# Patient Record
Sex: FEMALE | Race: ASIAN | HISPANIC OR LATINO | URBAN - METROPOLITAN AREA
[De-identification: names, ages, dates, MRNs, and addresses within clinical notes are randomized per-mention and may not be internally consistent; named-entity substitution may affect disease eponyms.]

---

## 2018-06-04 ENCOUNTER — OUTPATIENT (OUTPATIENT)
Dept: OUTPATIENT SERVICES | Facility: HOSPITAL | Age: 78
LOS: 1 days | End: 2018-06-04
Payer: MEDICARE

## 2018-06-04 DIAGNOSIS — R06.02 SHORTNESS OF BREATH: ICD-10-CM

## 2018-06-04 PROCEDURE — 78452 HT MUSCLE IMAGE SPECT MULT: CPT

## 2018-06-04 PROCEDURE — 93016 CV STRESS TEST SUPVJ ONLY: CPT

## 2018-06-04 PROCEDURE — 93017 CV STRESS TEST TRACING ONLY: CPT

## 2018-06-04 PROCEDURE — 78452 HT MUSCLE IMAGE SPECT MULT: CPT | Mod: 26

## 2018-06-04 PROCEDURE — A9500: CPT

## 2018-06-04 PROCEDURE — 93018 CV STRESS TEST I&R ONLY: CPT

## 2018-06-04 PROCEDURE — A9505: CPT

## 2018-09-28 ENCOUNTER — OUTPATIENT (OUTPATIENT)
Dept: OUTPATIENT SERVICES | Facility: HOSPITAL | Age: 78
LOS: 1 days | End: 2018-09-28
Payer: MEDICARE

## 2018-09-28 PROCEDURE — 71046 X-RAY EXAM CHEST 2 VIEWS: CPT | Mod: 26

## 2018-09-28 PROCEDURE — 71046 X-RAY EXAM CHEST 2 VIEWS: CPT

## 2018-10-02 ENCOUNTER — APPOINTMENT (OUTPATIENT)
Dept: PULMONOLOGY | Facility: CLINIC | Age: 78
End: 2018-10-02
Payer: MEDICARE

## 2018-10-02 VITALS
HEIGHT: 56 IN | SYSTOLIC BLOOD PRESSURE: 120 MMHG | TEMPERATURE: 98.7 F | BODY MASS INDEX: 25.19 KG/M2 | HEART RATE: 79 BPM | WEIGHT: 112 LBS | RESPIRATION RATE: 12 BRPM | DIASTOLIC BLOOD PRESSURE: 70 MMHG | OXYGEN SATURATION: 97 %

## 2018-10-02 DIAGNOSIS — Z87.01 PERSONAL HISTORY OF PNEUMONIA (RECURRENT): ICD-10-CM

## 2018-10-02 PROCEDURE — 99204 OFFICE O/P NEW MOD 45 MIN: CPT

## 2018-10-09 ENCOUNTER — APPOINTMENT (OUTPATIENT)
Dept: CT IMAGING | Facility: HOSPITAL | Age: 78
End: 2018-10-09
Payer: MEDICARE

## 2018-10-09 ENCOUNTER — OUTPATIENT (OUTPATIENT)
Dept: OUTPATIENT SERVICES | Facility: HOSPITAL | Age: 78
LOS: 1 days | End: 2018-10-09
Payer: MEDICARE

## 2018-10-09 PROCEDURE — 71250 CT THORAX DX C-: CPT

## 2018-10-09 PROCEDURE — 71250 CT THORAX DX C-: CPT | Mod: 26

## 2018-11-15 ENCOUNTER — APPOINTMENT (OUTPATIENT)
Dept: PULMONOLOGY | Facility: CLINIC | Age: 78
End: 2018-11-15
Payer: MEDICARE

## 2018-11-15 VITALS
HEIGHT: 56 IN | TEMPERATURE: 98.8 F | OXYGEN SATURATION: 97 % | BODY MASS INDEX: 24.35 KG/M2 | WEIGHT: 108.25 LBS | SYSTOLIC BLOOD PRESSURE: 110 MMHG | HEART RATE: 82 BPM | DIASTOLIC BLOOD PRESSURE: 70 MMHG

## 2018-11-15 PROCEDURE — 99214 OFFICE O/P EST MOD 30 MIN: CPT

## 2018-11-20 ENCOUNTER — APPOINTMENT (OUTPATIENT)
Dept: SURGERY | Facility: CLINIC | Age: 78
End: 2018-11-20
Payer: MEDICARE

## 2018-11-20 VITALS
TEMPERATURE: 97.9 F | OXYGEN SATURATION: 99 % | BODY MASS INDEX: 24.62 KG/M2 | SYSTOLIC BLOOD PRESSURE: 153 MMHG | DIASTOLIC BLOOD PRESSURE: 75 MMHG | WEIGHT: 109.44 LBS | HEART RATE: 73 BPM | HEIGHT: 56 IN

## 2018-11-20 PROCEDURE — 99203 OFFICE O/P NEW LOW 30 MIN: CPT

## 2018-12-05 ENCOUNTER — OUTPATIENT (OUTPATIENT)
Dept: OUTPATIENT SERVICES | Facility: HOSPITAL | Age: 78
LOS: 1 days | End: 2018-12-05
Payer: MEDICARE

## 2018-12-05 PROCEDURE — 74181 MRI ABDOMEN W/O CONTRAST: CPT | Mod: 26

## 2018-12-05 PROCEDURE — 74181 MRI ABDOMEN W/O CONTRAST: CPT

## 2018-12-07 ENCOUNTER — APPOINTMENT (OUTPATIENT)
Dept: SURGERY | Facility: CLINIC | Age: 78
End: 2018-12-07
Payer: MEDICARE

## 2018-12-07 VITALS
HEART RATE: 79 BPM | HEIGHT: 56 IN | WEIGHT: 111.25 LBS | DIASTOLIC BLOOD PRESSURE: 72 MMHG | TEMPERATURE: 97.6 F | BODY MASS INDEX: 25.02 KG/M2 | OXYGEN SATURATION: 97 % | SYSTOLIC BLOOD PRESSURE: 147 MMHG

## 2018-12-07 PROCEDURE — 99213 OFFICE O/P EST LOW 20 MIN: CPT

## 2018-12-19 ENCOUNTER — APPOINTMENT (OUTPATIENT)
Dept: PULMONOLOGY | Facility: CLINIC | Age: 78
End: 2018-12-19
Payer: MEDICARE

## 2018-12-19 VITALS
TEMPERATURE: 98.6 F | DIASTOLIC BLOOD PRESSURE: 70 MMHG | SYSTOLIC BLOOD PRESSURE: 110 MMHG | WEIGHT: 112.38 LBS | OXYGEN SATURATION: 97 % | BODY MASS INDEX: 25.28 KG/M2 | HEART RATE: 95 BPM | HEIGHT: 56 IN

## 2018-12-19 PROCEDURE — 94727 GAS DIL/WSHOT DETER LNG VOL: CPT

## 2018-12-19 PROCEDURE — 99214 OFFICE O/P EST MOD 30 MIN: CPT | Mod: 25

## 2018-12-19 PROCEDURE — 94729 DIFFUSING CAPACITY: CPT

## 2018-12-19 PROCEDURE — 94060 EVALUATION OF WHEEZING: CPT

## 2019-01-05 ENCOUNTER — APPOINTMENT (OUTPATIENT)
Dept: MRI IMAGING | Facility: HOSPITAL | Age: 79
End: 2019-01-05
Payer: MEDICARE

## 2019-01-05 ENCOUNTER — OUTPATIENT (OUTPATIENT)
Dept: OUTPATIENT SERVICES | Facility: HOSPITAL | Age: 79
LOS: 1 days | End: 2019-01-05
Payer: MEDICARE

## 2019-01-05 PROCEDURE — 74183 MRI ABD W/O CNTR FLWD CNTR: CPT

## 2019-01-05 PROCEDURE — 74183 MRI ABD W/O CNTR FLWD CNTR: CPT | Mod: 26

## 2019-01-08 ENCOUNTER — APPOINTMENT (OUTPATIENT)
Dept: SURGERY | Facility: CLINIC | Age: 79
End: 2019-01-08
Payer: MEDICARE

## 2019-01-08 VITALS
SYSTOLIC BLOOD PRESSURE: 134 MMHG | BODY MASS INDEX: 25.19 KG/M2 | DIASTOLIC BLOOD PRESSURE: 75 MMHG | HEART RATE: 85 BPM | TEMPERATURE: 98 F | OXYGEN SATURATION: 96 % | HEIGHT: 56 IN | WEIGHT: 112 LBS

## 2019-01-08 PROCEDURE — 99213 OFFICE O/P EST LOW 20 MIN: CPT

## 2019-01-08 NOTE — HISTORY OF PRESENT ILLNESS
[de-identified] : Pt is a 77 y/o F with hx of pulmonary nodules, cough who presents today for follow-up of pancreatic cyst. Recent MRI showed multiple pancreatic cysts, largest cyst increased from 2016 report measuring 2.5x 2.0cm of increasing complexity and enhancing mural nodule.

## 2019-01-09 LAB
CANCER AG125 SERPL-ACNC: 11 U/ML
CANCER AG19-9 SERPL-ACNC: 10.6 U/ML
CEA SERPL-MCNC: 2.6 NG/ML

## 2019-01-10 LAB
ALBUMIN SERPL ELPH-MCNC: 4.4 G/DL
ALP BLD-CCNC: 82 U/L
ALT SERPL-CCNC: 13 U/L
ANION GAP SERPL CALC-SCNC: 19 MMOL/L
AST SERPL-CCNC: 19 U/L
BILIRUB SERPL-MCNC: <0.2 MG/DL
BUN SERPL-MCNC: 27 MG/DL
CALCIUM SERPL-MCNC: 9.6 MG/DL
CHLORIDE SERPL-SCNC: 105 MMOL/L
CO2 SERPL-SCNC: 19 MMOL/L
CREAT SERPL-MCNC: 0.79 MG/DL
GLUCOSE SERPL-MCNC: 81 MG/DL
POTASSIUM SERPL-SCNC: 4.5 MMOL/L
PROT SERPL-MCNC: 7.5 G/DL
SODIUM SERPL-SCNC: 143 MMOL/L

## 2019-01-16 ENCOUNTER — OUTPATIENT (OUTPATIENT)
Dept: OUTPATIENT SERVICES | Facility: HOSPITAL | Age: 79
LOS: 1 days | Discharge: HOME | End: 2019-01-16
Payer: MEDICARE

## 2019-01-16 ENCOUNTER — APPOINTMENT (OUTPATIENT)
Dept: GASTROENTEROLOGY | Facility: HOSPITAL | Age: 79
End: 2019-01-16

## 2019-01-16 ENCOUNTER — RESULT REVIEW (OUTPATIENT)
Age: 79
End: 2019-01-16

## 2019-01-16 PROCEDURE — 43242 EGD US FINE NEEDLE BX/ASPIR: CPT

## 2019-01-16 PROCEDURE — 88305 TISSUE EXAM BY PATHOLOGIST: CPT

## 2019-01-16 PROCEDURE — 88173 CYTOPATH EVAL FNA REPORT: CPT

## 2019-01-21 LAB — NON-GYNECOLOGICAL CYTOLOGY STUDY: SIGNIFICANT CHANGE UP

## 2019-02-12 ENCOUNTER — APPOINTMENT (OUTPATIENT)
Dept: SURGERY | Facility: CLINIC | Age: 79
End: 2019-02-12
Payer: MEDICARE

## 2019-02-12 VITALS
DIASTOLIC BLOOD PRESSURE: 77 MMHG | HEIGHT: 56 IN | BODY MASS INDEX: 24.87 KG/M2 | HEART RATE: 71 BPM | SYSTOLIC BLOOD PRESSURE: 129 MMHG | OXYGEN SATURATION: 97 % | WEIGHT: 110.56 LBS

## 2019-02-12 PROCEDURE — 99213 OFFICE O/P EST LOW 20 MIN: CPT

## 2019-02-12 NOTE — PLAN
[FreeTextEntry1] : Pt to be scheduled for distal pancreatectomy with possible splenectomy \par Pt to obtain medical and pulmonary clearance

## 2019-02-12 NOTE — HISTORY OF PRESENT ILLNESS
[de-identified] : Pt is a 79 y/o F with hx of multiple pancreactic cysts, pulmonary nodules, and cough who presents today feeling well. Pt had an MRI in january, to f/u with MRI with  in 2016. The largest cyst has increased in size, complexity, and evidence of enhancing mural nodule. Due to the change in character of this cyst, it is reccomended that the pt undergo distal pancreatectomy with possible splenectomy.

## 2019-02-19 ENCOUNTER — APPOINTMENT (OUTPATIENT)
Dept: GASTROENTEROLOGY | Facility: CLINIC | Age: 79
End: 2019-02-19
Payer: MEDICARE

## 2019-02-19 VITALS
HEART RATE: 83 BPM | WEIGHT: 110 LBS | RESPIRATION RATE: 14 BRPM | SYSTOLIC BLOOD PRESSURE: 121 MMHG | TEMPERATURE: 98.5 F | DIASTOLIC BLOOD PRESSURE: 77 MMHG | BODY MASS INDEX: 24.66 KG/M2 | OXYGEN SATURATION: 96 %

## 2019-02-19 PROCEDURE — 99214 OFFICE O/P EST MOD 30 MIN: CPT

## 2019-02-19 NOTE — PHYSICAL EXAM
[General Appearance - Alert] : alert [General Appearance - In No Acute Distress] : in no acute distress [Sclera] : the sclera and conjunctiva were normal [Outer Ear] : the ears and nose were normal in appearance [Neck Appearance] : the appearance of the neck was normal [Neck Cervical Mass (___cm)] : no neck mass was observed [] : no respiratory distress [Heart Rate And Rhythm] : heart rate was normal and rhythm regular [Abdomen Soft] : soft [Abdomen Tenderness] : non-tender [Abdomen Mass (___ Cm)] : no abdominal mass palpated [Cervical Lymph Nodes Enlarged Anterior Bilaterally] : anterior cervical [No CVA Tenderness] : no ~M costovertebral angle tenderness [Abnormal Walk] : normal gait [Skin Color & Pigmentation] : normal skin color and pigmentation [Oriented To Time, Place, And Person] : oriented to person, place, and time

## 2019-02-19 NOTE — HISTORY OF PRESENT ILLNESS
[Heartburn] : denies heartburn [Nausea] : denies nausea [Vomiting] : denies vomiting [Diarrhea] : denies diarrhea [Constipation] : denies constipation [Yellow Skin Or Eyes (Jaundice)] : denies jaundice [Abdominal Pain] : denies abdominal pain [Abdominal Swelling] : denies abdominal swelling [de-identified] : 78 female with PMH of asthma pancreatic cysts presents for a follow up visit to discuss her recent EUS and further plans for surgery. Patient has pancreatic cysts and underwent EUS/FNA in 1/19 , she was found to have multiple pancreatic cyst with a large complex panc cyst in distal body/tail with enhancing mural nodule, FNA was + for a mucinous lesion. Subsequently patient followed up with surgery and they recommended distal pancreatectomy with splenectomy. Patient states that she is not sure what her diagnosis is and if she has cancer or not and also wants to consider a second surgery opinion before she goes ahead with any surgery.\par Denies any pain abdomen/n/v/change in weight or appetite/diarrhea/constipation.

## 2019-03-08 ENCOUNTER — APPOINTMENT (OUTPATIENT)
Dept: SURGERY | Facility: CLINIC | Age: 79
End: 2019-03-08
Payer: MEDICARE

## 2019-03-08 VITALS
WEIGHT: 107.38 LBS | SYSTOLIC BLOOD PRESSURE: 151 MMHG | HEART RATE: 71 BPM | BODY MASS INDEX: 24.16 KG/M2 | OXYGEN SATURATION: 96 % | HEIGHT: 56 IN | DIASTOLIC BLOOD PRESSURE: 78 MMHG

## 2019-03-08 DIAGNOSIS — R93.89 ABNORMAL FINDINGS ON DIAGNOSTIC IMAGING OF OTHER SPECIFIED BODY STRUCTURES: ICD-10-CM

## 2019-03-08 PROCEDURE — 99213 OFFICE O/P EST LOW 20 MIN: CPT

## 2019-03-14 ENCOUNTER — APPOINTMENT (OUTPATIENT)
Dept: GERIATRICS | Facility: CLINIC | Age: 79
End: 2019-03-14
Payer: MEDICARE

## 2019-03-14 VITALS
DIASTOLIC BLOOD PRESSURE: 80 MMHG | HEIGHT: 56 IN | RESPIRATION RATE: 14 BRPM | OXYGEN SATURATION: 96 % | TEMPERATURE: 97.9 F | HEART RATE: 75 BPM | WEIGHT: 107 LBS | SYSTOLIC BLOOD PRESSURE: 145 MMHG | BODY MASS INDEX: 24.07 KG/M2

## 2019-03-14 DIAGNOSIS — K86.2 CYST OF PANCREAS: ICD-10-CM

## 2019-03-14 PROCEDURE — 99204 OFFICE O/P NEW MOD 45 MIN: CPT | Mod: 25

## 2019-03-14 PROCEDURE — 99497 ADVNCD CARE PLAN 30 MIN: CPT

## 2019-03-15 NOTE — PHYSICAL EXAM
[General Appearance - Alert] : alert [General Appearance - In No Acute Distress] : in no acute distress [General Appearance - Well Nourished] : well nourished [General Appearance - Well Developed] : well developed [General Appearance - Well-Appearing] : healthy appearing [Sclera] : the sclera and conjunctiva were normal [PERRL With Normal Accommodation] : pupils were equal in size, round, and reactive to light [Extraocular Movements] : extraocular movements were intact [Normal Oral Mucosa] : normal oral mucosa [Outer Ear] : the ears and nose were normal in appearance [Hearing Threshold Finger Rub Not Mora] : hearing was normal [Examination Of The Oral Cavity] : the lips and gums were normal [Both Tympanic Membranes Were Examined] : both tympanic membranes were normal [Nasal Cavity] : the nasal mucosa and septum were normal [Oropharynx] : The oropharynx was normal [Neck Appearance] : the appearance of the neck was normal [Neck Cervical Mass (___cm)] : no neck mass was observed [Jugular Venous Distention Increased] : there was no jugular-venous distention [Respiration, Rhythm And Depth] : normal respiratory rhythm and effort [Auscultation Breath Sounds / Voice Sounds] : lungs were clear to auscultation bilaterally [Heart Rate And Rhythm] : heart rate was normal and rhythm regular [Heart Sounds] : normal S1 and S2 [Breast Palpation Mass] : no palpable masses [Bowel Sounds] : normal bowel sounds [Abdomen Soft] : soft [Abdomen Mass (___ Cm)] : no abdominal mass palpated [No CVA Tenderness] : no ~M costovertebral angle tenderness [No Spinal Tenderness] : no spinal tenderness [Abnormal Walk] : normal gait [Nail Clubbing] : no clubbing  or cyanosis of the fingernails [Motor Tone] : muscle strength and tone were normal [Skin Color & Pigmentation] : normal skin color and pigmentation [Skin Turgor] : normal skin turgor [] : no rash [Cranial Nerves] : cranial nerves 2-12 were intact [Sensation] : the sensory exam was normal to light touch and pinprick [Motor Exam] : the motor exam was normal [Oriented To Time, Place, And Person] : oriented to person, place, and time [Impaired Insight] : insight and judgment were intact [Affect] : the affect was normal

## 2019-03-22 NOTE — REVIEW OF SYSTEMS
[Negative] : Heme/Lymph [Anxiety] : no anxiety [Depression] : no depression [FreeTextEntry6] : uses inhaler rarely [FreeTextEntry7] : appetitie good , no significant weight change [de-identified] : sleeps 9 hours daily, participates in activities all day, enjoys watching tv, spend time on computer daily

## 2019-03-22 NOTE — COMPREHENSIVE ASSESSMENT
[Alert] : alert [Oriented To Situation] : ~L oriented to situation [Understands and communicates without difficulty] : Understands and communicates without difficulty [Safety elements used in home] : safety elements used in home [Caregiver Concerns] : has caregiver concerns [Active tobbaco user] : Patient is not a tobacco user [de-identified] : 2 pack per day for 15 years - discontinued 35 years ago [Reports changes in hearing] : Reports no changes in hearing [Reports changes in vision] : Reports no changes in vision [Community support currently involved] : community support not currently involved. [de-identified] : last eval 3/19 [de-identified] : has upper and lower dentures [de-identified] : sister jean offers no assitance [MammogramDate] : 2014 [PapSmearDate] : 2017 [BoneDensityDate] : 2015 [ColonoscopyDate] : 2016

## 2019-03-22 NOTE — REASON FOR VISIT
[Initial Evaluation] : an initial evaluation [FreeTextEntry1] : patient presents for evaluation as she is getting older, she feels has increased needs

## 2019-03-22 NOTE — ASSESSMENT
[Social Work Referral] : Social Work referral [Senior Center: _____] : Senior Center: [unfilled] [Socialization: _____] : socialization: [unfilled] [Medications discussed: _____] : Medications discussed: [unfilled] [Remove loose items] : remove loose rugs or any other loose items that could lead to tripping and falling [Adequate lighting] : provide adequate lighting and use a night light [Medication Review] : reviewed medications that increase the risk of falls [Minimize falling] : use grab bars, anti- slip rugs, and proper shoes to minimize falling [Weight bearing exercise] : recommend weight bearing exercise as tolerated [Dietary calcium intake] : dietary calcium intake discussed [Vitamin D supplementation] : Vitamin D supplementation [Dexa Scan] : dexa scan [Daily physical exercise as tolerated] : Daily physical exercise as tolerated [Discussed at today's visit] : Advance Directives Discussed at today's visit [FreeTextEntry1] : 78 year old female presents for geriatric evaluation- lives alone, feels well, maintains quality of life with friends and family independent of ADL and IADL. Recent progression in size of pancreatic cysts of unclear etiology to be scheduled for procedure to remove cysts and splenectomy.  patient has insight to future needs and would like additional coordiantion of care.\par 1. Primary care- i explained to patient that guidelines regarding yearly routine blood work has changed considerably over the years and that her PMD not drawing blood work should not determine competence.\par I encouraged patient to have confidence in her medical team and if she is seeking new PMD I can assist her in referral or option of [facility] primary care resident clinic where i am one of the supervising physicians.\par 2. Pancreatic cysts- reviewed with patient Dr. Tami Walsh assessment and plan- she is agreeable to proceed.\par We discussed her present home situation, she will contact  in [facility] to see if she can hire someone privately to assist her in activities of daily living upon her discharge. We discussed possibility of sub acute rehab as well if indicated. She acknowledges that her sister who lives with her would not be able to help and her other sisters are not well enough. We discussed the need for updated Health Care proxy- to educated her assigned proxy as to what she would want in the event she may not be able to speak for herself. Discussed MOLST and gave blank copy in [place] for her review to be completed on next MD visit.\par 3. Financial limitations and anticipated future needs- patient will discuss with  medicaid elgibility and [facility].  \par 4. history of fracture- supplement calcium and vitamin d in diet- consider bone denisty\par 5. Health care maintenance and vaccines- obtain records- regarding Pneumovax and Prevnar, shingrix and tetanus.  \par 6. [FreeTextEntry4] : HCP and MOLST given in Nepali and english\par greater 16 minutes spent on advanced care planning

## 2019-03-22 NOTE — SOCIAL HISTORY
[Lives in own residence] : living in ~his/her~ own residence [Fully functional (bathing, dressing, toileting, transferring, walking, feeding)] : Fully functional (bathing, dressing, toileting, transferring, walking, feeding) [Fully functional (using the telephone, shopping, preparing meals, housekeeping, doing laundry, using transportation,] : Fully functional and needs no help or supervision to perform IADLs (using the telephone, shopping, preparing meals, housekeeping, doing laundry, using transportation, managing medications and managing finances) [] :  [No falls in past year] : Patient reported no falls in the past year [FreeTextEntry1] : patient takes advantage of all activities that [facility] provides: bingo, trips and lunch daily- patient residence is next to [facility]. [de-identified] : Patient relays two falls - one with shopping cart- mechanical fall some years ago  and two years ago awakend by alarm clock fell sustained clavicular fracture

## 2019-03-22 NOTE — HISTORY OF PRESENT ILLNESS
[FreeTextEntry1] : patient is a good historian. She states she has had good medical care over the years. Well controlled asthma -rarely uses inhaler- distant tobacco use Followed by Dr. Maurice at [address] [place] [address] for over ten years until her retired and then by Dr. Guillen. Prior to that her care was in [place] where she lived.\par she presents today for geriatric evaluation as well as finding a new PMD.\par she states she does not feel confident with present care for one reason that he does not do comprehensive blood work yearly as she is accustomed to. \par She states overall she is in excellent health, she has no limitations or fears. \par She had to be reminded of recent concern of lung nodules and pancreatic cysts. \par She states these were found incidentally and overall she feels great.\par She is able to relay that she has gone for two opinions regarding the progressive enlargement of pancreatic cysts and concern of the underlying etiology of growth. She understands the best approach is to remove it and waiting for surgical date.\par I asked her if there were any concerns that initiated Dr. Mercado evaluation and findings of abnormal chest x ray- she denies.\par I have no collateral medical records to confirm above.\par

## 2019-03-26 ENCOUNTER — NON-APPOINTMENT (OUTPATIENT)
Age: 79
End: 2019-03-26

## 2019-03-26 ENCOUNTER — APPOINTMENT (OUTPATIENT)
Dept: PULMONOLOGY | Facility: CLINIC | Age: 79
End: 2019-03-26
Payer: MEDICARE

## 2019-03-26 VITALS
TEMPERATURE: 98.7 F | HEIGHT: 56 IN | OXYGEN SATURATION: 94 % | WEIGHT: 105 LBS | BODY MASS INDEX: 23.62 KG/M2 | DIASTOLIC BLOOD PRESSURE: 80 MMHG | SYSTOLIC BLOOD PRESSURE: 130 MMHG | HEART RATE: 75 BPM

## 2019-03-26 PROCEDURE — 99214 OFFICE O/P EST MOD 30 MIN: CPT

## 2019-03-26 NOTE — PHYSICAL EXAM
[General Appearance - Well Developed] : well developed [Normal Appearance] : normal appearance [Well Groomed] : well groomed [General Appearance - Well Nourished] : well nourished [No Deformities] : no deformities [General Appearance - In No Acute Distress] : no acute distress [Normal Conjunctiva] : the conjunctiva exhibited no abnormalities [Eyelids - No Xanthelasma] : the eyelids demonstrated no xanthelasmas [Normal Oropharynx] : normal oropharynx [Neck Appearance] : the appearance of the neck was normal [Neck Cervical Mass (___cm)] : no neck mass was observed [Jugular Venous Distention Increased] : there was no jugular-venous distention [Thyroid Diffuse Enlargement] : the thyroid was not enlarged [Thyroid Nodule] : there were no palpable thyroid nodules [Heart Rate And Rhythm] : heart rate and rhythm were normal [Heart Sounds] : normal S1 and S2 [Murmurs] : no murmurs present [Respiration, Rhythm And Depth] : normal respiratory rhythm and effort [Exaggerated Use Of Accessory Muscles For Inspiration] : no accessory muscle use [Auscultation Breath Sounds / Voice Sounds] : lungs were clear to auscultation bilaterally [Abdomen Soft] : soft [Abdomen Tenderness] : non-tender [Abdomen Mass (___ Cm)] : no abdominal mass palpated [Abnormal Walk] : normal gait [Gait - Sufficient For Exercise Testing] : the gait was sufficient for exercise testing [Nail Clubbing] : no clubbing of the fingernails [Cyanosis, Localized] : no localized cyanosis [Petechial Hemorrhages (___cm)] : no petechial hemorrhages [Deep Tendon Reflexes (DTR)] : deep tendon reflexes were 2+ and symmetric [Sensation] : the sensory exam was normal to light touch and pinprick [No Focal Deficits] : no focal deficits [Skin Color & Pigmentation] : normal skin color and pigmentation [Skin Turgor] : normal skin turgor [] : no rash

## 2019-03-26 NOTE — HISTORY OF PRESENT ILLNESS
[Stable] : are stable [Difficulty Breathing During Exertion] : denies dyspnea on exertion [Feelings Of Weakness On Exertion] : denies exercise intolerance [Cough] : improved coughing [Wheezing] : denies wheezing [Regional Soft Tissue Swelling Both Lower Extremities] : denies lower extremity edema [Chest Pain Or Discomfort] : denies chest pain [Fever] : denies fever [0  -  Nothing at all] : 0, nothing at all [Class I - No Symptoms and No Limitations] : I [Date: ___] : was performed [unfilled] [___ Times a Week] : [unfilled] time(s) a week [___ Year Quit] : ~He/She~ quit smoking in [unfilled] [___ Pack Year History] : [unfilled] pack year history [None] : The patient is not currently on any medications [Wt Gain ___ Lbs] : no recent weight gain [Wt Loss ___ Lbs] : no recent weight loss [Oxygen] : the patient uses no supplemental oxygen [More Frequent Use Needed Recently] : Patient reports no recent increase in frequency of [de-identified] : multiple pulmonary nodules.   [FreeTextEntry1] : Pt is a 78 year old female with PMH of former smoker (quit 1980s with 15 pack years) pulmonary nodules, Asthma and PNA.  Pt presents today for follow up.\par \par Pt was originally referred to us for an abnormal CXR from her PCP Dr. Mercado.  Follow up CT scan was performed October 9, 2018, with findings of multiple solid subcentimeter nodules largest 5 mm and signs of small and large airways disease with mild air trapping and tree and bud nodules.  Incidental finding was 2 cystic structures, measuring 2 cm, in the pancreatic bed.  \par \par Patients Mammograms (2016) and colonoscopy (2014) have been normal and up to date. She is uptodate with flu vaccine received 2 weeks ago.\par \par Pt was seen by Dr. Jamison was informed she needs surg. However pt wanted second opinion and went to Dr. Walsh.  She is planning to forward with Surg with Dr. Walsh for Robotic assisted distal pancreatectomy and splenectomy. \par \par She has complaints of cough that started 10 days.  She was seen by Dr. Mercado was given Azithromycin for 5 days finished the course with some relief and benzonatate.   However cough persists.  Denies fever, productive cough or increased SOB.  She has complaints of wheezing for 4 days.  \par \par Pt continues on Breo.  She is rarely using her Ventolin, has needed it 2 x since been sick.  She walks over 5 blocks daily without any SOB and denies SOB with ADLS. Denies any ER or  hospital visit due to asthma. \par \par \par

## 2019-03-26 NOTE — PROCEDURE
[FreeTextEntry1] : PFT. No signs of OLD or RLD without any significant response to bronchodilator.  TLC and DLCO WNL.

## 2019-03-26 NOTE — ASSESSMENT
[FreeTextEntry1] : Pt is a 78 year old female with PMH of former smoker (quit 1980s with 15 pack years) pulmonary nodules, Asthma and PNA.\par \par Pulmonary nodules\par \par Pt was originally referred to us for an abnormal CXR from her PCP Dr. Mercado.  Follow up CT scan was performed October 9, 2018, with findings of multiple solid subcentimeter nodules largest 5 mm and signs of small and large airways disease with mild air trapping and tree and bud nodules.  Incidental finding was 2 cystic structures, measuring 2 cm, in the pancreatic bed.  \par \par Discussed pulmonary nodules could be due to scarring, inflammation or malignancy, but not limited too.  Reviewed Fleischer society guidelines with pt and high risk due to smoking history. \par \par Plan \par - Follow up with repeat CT scan in one year\par \par Asthma\par \par Pt is currently unstable rarely her LEILA 2 times since sick.    She finished a course of antibiotics Zpack. On exam pt with right upper wheezing.   She is currently only using Breo and has not required her Ventolin since last visit.  \par \par Plan \par - Allamuchy today without significant change from PFT \par - Increased the pt Breo to high dose given sample and continue on Ventolin as needed.  She was given education on worsening asthma\par - Medrol dose pack provided \par - Follow up in one to two weeks for clearance for surg. \par

## 2019-04-09 ENCOUNTER — FORM ENCOUNTER (OUTPATIENT)
Age: 79
End: 2019-04-09

## 2019-04-10 ENCOUNTER — OUTPATIENT (OUTPATIENT)
Dept: OUTPATIENT SERVICES | Facility: HOSPITAL | Age: 79
LOS: 1 days | End: 2019-04-10
Payer: COMMERCIAL

## 2019-04-10 ENCOUNTER — APPOINTMENT (OUTPATIENT)
Dept: PULMONOLOGY | Facility: CLINIC | Age: 79
End: 2019-04-10
Payer: MEDICARE

## 2019-04-10 VITALS
TEMPERATURE: 98.9 F | HEIGHT: 56 IN | HEART RATE: 78 BPM | WEIGHT: 105 LBS | DIASTOLIC BLOOD PRESSURE: 70 MMHG | BODY MASS INDEX: 23.62 KG/M2 | OXYGEN SATURATION: 96 % | SYSTOLIC BLOOD PRESSURE: 120 MMHG

## 2019-04-10 PROCEDURE — 71046 X-RAY EXAM CHEST 2 VIEWS: CPT

## 2019-04-10 PROCEDURE — 71046 X-RAY EXAM CHEST 2 VIEWS: CPT | Mod: 26

## 2019-04-10 PROCEDURE — 99214 OFFICE O/P EST MOD 30 MIN: CPT

## 2019-04-10 NOTE — HISTORY OF PRESENT ILLNESS
[Stable] : are stable [Regional Soft Tissue Swelling Both Lower Extremities] : denies lower extremity edema [Wheezing] : denies wheezing [Chest Pain Or Discomfort] : denies chest pain [Fever] : denies fever [0  -  Nothing at all] : 0, nothing at all [Date: ___] : was performed [unfilled] [Class I - No Symptoms and No Limitations] : I [___ Times a Week] : [unfilled] time(s) a week [___ Year Quit] : ~He/She~ quit smoking in [unfilled] [___ Pack Year History] : [unfilled] pack year history [None] : The patient is not currently on any medications [Difficulty Breathing During Exertion] : stable dyspnea on exertion [Cough] : denies coughing [Feelings Of Weakness On Exertion] : stable exercise intolerance [Wt Gain ___ Lbs] : no recent weight gain [Oxygen] : the patient uses no supplemental oxygen [Wt Loss ___ Lbs] : no recent weight loss [de-identified] : multiple pulmonary nodules.   [More Frequent Use Needed Recently] : Patient reports no recent increase in frequency of [FreeTextEntry1] : Pt is a 78 year old female with PMH of former smoker (quit 1980s with 15 pack years) pulmonary nodules, Asthma and PNA.  Pt presents today for follow up.\par \par Pt was originally referred to us for an abnormal CXR from her PCP Dr. Mercado.  Follow up CT scan was performed October 9, 2018, with findings of multiple solid subcentimeter nodules largest 5 mm and signs of small and large airways disease with mild air trapping and tree and bud nodules.  Incidental finding was 2 cystic structures, measuring 2 cm, in the pancreatic bed.  \par \par Patients Mammograms (2016) and colonoscopy (2014) have been normal and up to date. She is uptodate with flu vaccine received 2 weeks ago.\par \par Pt was seen by Dr. Jamison was informed she needs surg. However pt wanted second opinion and went to Dr. Walsh.  She is scheduled for surg with Jasmyn Walsh for Robotic assisted distal pancreatectomy and splenectomy at the end of the month. \par \par Her cough has improved since last visit.  Denies fever, cough, SOB or wheezing. Denies any nocturnal awakening.  ACT score is 25/25.  She finished the sample of the breo 200 mcg yesterday and has not required the Ventolin.  \par \par Pt here for pulmonary clearance for surg with Dr. Walsh.  Fax to surgical coordinator [phone]\par She walks over 5 blocks daily without any SOB and denies SOB with ADLS. Denies any ER or  hospital visit due to asthma. \par \par \par \par \par

## 2019-04-10 NOTE — PHYSICAL EXAM
[General Appearance - Well Developed] : well developed [Well Groomed] : well groomed [Normal Appearance] : normal appearance [General Appearance - Well Nourished] : well nourished [No Deformities] : no deformities [General Appearance - In No Acute Distress] : no acute distress [Normal Conjunctiva] : the conjunctiva exhibited no abnormalities [Eyelids - No Xanthelasma] : the eyelids demonstrated no xanthelasmas [Normal Oropharynx] : normal oropharynx [Neck Appearance] : the appearance of the neck was normal [Neck Cervical Mass (___cm)] : no neck mass was observed [Thyroid Nodule] : there were no palpable thyroid nodules [Jugular Venous Distention Increased] : there was no jugular-venous distention [Thyroid Diffuse Enlargement] : the thyroid was not enlarged [Heart Rate And Rhythm] : heart rate and rhythm were normal [Heart Sounds] : normal S1 and S2 [Murmurs] : no murmurs present [Exaggerated Use Of Accessory Muscles For Inspiration] : no accessory muscle use [Respiration, Rhythm And Depth] : normal respiratory rhythm and effort [Auscultation Breath Sounds / Voice Sounds] : lungs were clear to auscultation bilaterally [Abdomen Soft] : soft [Abdomen Mass (___ Cm)] : no abdominal mass palpated [Abdomen Tenderness] : non-tender [Abnormal Walk] : normal gait [Gait - Sufficient For Exercise Testing] : the gait was sufficient for exercise testing [Cyanosis, Localized] : no localized cyanosis [Nail Clubbing] : no clubbing of the fingernails [Deep Tendon Reflexes (DTR)] : deep tendon reflexes were 2+ and symmetric [Petechial Hemorrhages (___cm)] : no petechial hemorrhages [Sensation] : the sensory exam was normal to light touch and pinprick [Skin Color & Pigmentation] : normal skin color and pigmentation [No Focal Deficits] : no focal deficits [Skin Turgor] : normal skin turgor [] : no rash

## 2019-04-10 NOTE — ASSESSMENT
[FreeTextEntry1] : Pt is a 78 year old female with PMH of former smoker (quit 1980s with 15 pack years) pulmonary nodules, Asthma and PNA.\par \par Pulmonary nodules\par \par Pt was originally referred to us for an abnormal CXR from her PCP Dr. Mercado.  Follow up CT scan was performed October 9, 2018, with findings of multiple solid subcentimeter nodules largest 5 mm and signs of small and large airways disease with mild air trapping and tree and bud nodules.  Incidental finding was 2 cystic structures, measuring 2 cm, in the pancreatic bed.  \par \par Discussed pulmonary nodules could be due to scarring, inflammation or malignancy, but not limited too.  Reviewed Fleischer society guidelines with pt and high risk due to smoking history. \par \par Plan \par - Follow up with repeat CT scan in one year (10/2019)\par \par Asthma\par \par Pt is currently stable and rarely requiring her LEILA. She is to decrease her Breo back to 100 mcg.  ACT score 25/25. Her cough has improved since last visit. Slaughter last visit without significant change from prior PFT. \par \par Pulmonary preop\par \par RUBINA CARVAJAL  is optimized for surgery. she  is to be extubated once fully awake and able to protect airway.  The patient is to be monitored in the recovery room. They might benefit for high flow oxygen or noninvasive ventilation to prevent or reverse atelectasis.  Patient is to be admitted to a monitored bed postoperatively.  Avoid oversedation.  RUBINA is high risk for DVT and will require bimodal agents for DVT prophylaxis early mobilization is recommended. she is to use the incentive spirometry postoperative. \par \par - Follow CXR\par \par \par

## 2019-04-11 ENCOUNTER — APPOINTMENT (OUTPATIENT)
Dept: INTERNAL MEDICINE | Facility: CLINIC | Age: 79
End: 2019-04-11

## 2019-04-19 ENCOUNTER — OTHER (OUTPATIENT)
Age: 79
End: 2019-04-19

## 2019-04-30 ENCOUNTER — APPOINTMENT (OUTPATIENT)
Dept: PULMONOLOGY | Facility: CLINIC | Age: 79
End: 2019-04-30
Payer: MEDICARE

## 2019-04-30 ENCOUNTER — NON-APPOINTMENT (OUTPATIENT)
Age: 79
End: 2019-04-30

## 2019-04-30 VITALS
HEART RATE: 90 BPM | WEIGHT: 107 LBS | DIASTOLIC BLOOD PRESSURE: 80 MMHG | HEIGHT: 56 IN | OXYGEN SATURATION: 97 % | SYSTOLIC BLOOD PRESSURE: 130 MMHG | TEMPERATURE: 98.2 F | BODY MASS INDEX: 24.07 KG/M2

## 2019-04-30 VITALS
OXYGEN SATURATION: 97 % | HEIGHT: 55 IN | SYSTOLIC BLOOD PRESSURE: 137 MMHG | DIASTOLIC BLOOD PRESSURE: 63 MMHG | HEART RATE: 72 BPM | TEMPERATURE: 98 F | WEIGHT: 151.46 LBS | RESPIRATION RATE: 18 BRPM

## 2019-04-30 DIAGNOSIS — Z01.811 ENCOUNTER FOR PREPROCEDURAL RESPIRATORY EXAMINATION: ICD-10-CM

## 2019-04-30 PROCEDURE — 36415 COLL VENOUS BLD VENIPUNCTURE: CPT

## 2019-04-30 PROCEDURE — 93000 ELECTROCARDIOGRAM COMPLETE: CPT

## 2019-04-30 PROCEDURE — 99214 OFFICE O/P EST MOD 30 MIN: CPT | Mod: 25

## 2019-04-30 NOTE — ASSESSMENT
[FreeTextEntry1] : Pulmonary nodules\par \par Pt was originally referred to us for an abnormal CXR from her PCP Dr. Mercado. Follow up CT scan was performed October 9, 2018, with findings of multiple solid subcentimeter nodules largest 5 mm and signs of small and large airways disease with mild air trapping and tree and bud nodules. Incidental finding was 2 cystic structures, measuring 2 cm, in the pancreatic bed. \par \par Discussed pulmonary nodules could be due to scarring, inflammation or malignancy, but not limited too. Reviewed [employer] society guidelines with pt and high risk due to smoking history. \par \par Plan \par - Follow up with repeat CT scan in one year (10/2019)\par \par Asthma\par \par Pt is currently stable and rarely requiring her LEILA. Continue on Breo 200 until after surgery.  ACT score 25/25. Her cough has improved since last visit. Juan last visit without significant change from prior PFT. \par \par Pulmonary preop\par \par RUBINA CARVAJAL is optimized for surgery. she is to be extubated once fully awake and able to protect airway. The patient is to be monitored in the recovery room. They might benefit for high flow oxygen or noninvasive ventilation to prevent or reverse atelectasis. Patient is to be admitted to a monitored bed postoperatively. Avoid oversedation. RUBINA is high risk for DVT and will require bimodal agents for DVT prophylaxis early mobilization is recommended. she is to use the incentive spirometry postoperative. \par \par - follow on labs\par EKG RSR normal\par I discussed with Dr Stone and cleared cardiac abarca

## 2019-04-30 NOTE — HISTORY OF PRESENT ILLNESS
[Difficulty Breathing During Exertion] : denies dyspnea on exertion [Feelings Of Weakness On Exertion] : denies exercise intolerance [Cough] : denies coughing [Wheezing] : denies wheezing [Regional Soft Tissue Swelling Both Lower Extremities] : denies lower extremity edema [Chest Pain Or Discomfort] : denies chest pain [Fever] : denies fever [0  -  Nothing at all] : 0, nothing at all [Class II - Mild Symptoms and Slight Limitations] : II [More Frequent Use Needed Recently] : Patient reports recent increase in frequency of [___ Times a Day] : [unfilled] time(s) a day [Never] : was never a smoker [None] : None [Adherent] : the patient is adherent with ~his/her~ medication regimen [Goals--Doing Well] : the patient is doing well with ~his/her~ goals [Wt Gain ___ Lbs] : no recent weight gain [Wt Loss ___ Lbs] : no recent weight loss [Oxygen] : the patient uses no supplemental oxygen [Good Control] : peak flow has been poor [Side Effects] : ~He/She~ denies medication side effects [FreeTextEntry1] : She is doing very well. Her run off Breo the last 4 days. She received Ventolin as needed basis. No severe shortness 14 blocks with no problem. She does not wake up in the middle night gasping for air. The leg is not swollen.

## 2019-05-01 ENCOUNTER — RESULT REVIEW (OUTPATIENT)
Age: 79
End: 2019-05-01

## 2019-05-01 ENCOUNTER — INPATIENT (INPATIENT)
Facility: HOSPITAL | Age: 79
LOS: 4 days | Discharge: HOME | DRG: 407 | End: 2019-05-06
Attending: SURGERY | Admitting: SURGERY
Payer: MEDICARE

## 2019-05-01 DIAGNOSIS — Z98.890 OTHER SPECIFIED POSTPROCEDURAL STATES: Chronic | ICD-10-CM

## 2019-05-01 LAB
ALBUMIN SERPL ELPH-MCNC: 4.1 G/DL
ALP BLD-CCNC: 92 U/L
ALT SERPL-CCNC: 14 U/L
ANION GAP SERPL CALC-SCNC: 11 MMOL/L
ANION GAP SERPL CALC-SCNC: 12 MMOL/L — SIGNIFICANT CHANGE UP (ref 5–17)
APTT BLD: 28.3 SEC — SIGNIFICANT CHANGE UP (ref 27.5–36.3)
APTT BLD: 36.6 SEC
AST SERPL-CCNC: 19 U/L
BASOPHILS # BLD AUTO: 0.03 K/UL
BASOPHILS # BLD AUTO: 0.06 K/UL — SIGNIFICANT CHANGE UP (ref 0–0.2)
BASOPHILS NFR BLD AUTO: 0.3 %
BASOPHILS NFR BLD AUTO: 0.3 % — SIGNIFICANT CHANGE UP (ref 0–2)
BILIRUB SERPL-MCNC: 0.3 MG/DL
BUN SERPL-MCNC: 11 MG/DL — SIGNIFICANT CHANGE UP (ref 7–23)
BUN SERPL-MCNC: 19 MG/DL
CALCIUM SERPL-MCNC: 8.7 MG/DL — SIGNIFICANT CHANGE UP (ref 8.4–10.5)
CALCIUM SERPL-MCNC: 9.3 MG/DL
CHLORIDE SERPL-SCNC: 102 MMOL/L — SIGNIFICANT CHANGE UP (ref 96–108)
CHLORIDE SERPL-SCNC: 105 MMOL/L
CO2 SERPL-SCNC: 24 MMOL/L — SIGNIFICANT CHANGE UP (ref 22–31)
CO2 SERPL-SCNC: 26 MMOL/L
CREAT SERPL-MCNC: 0.62 MG/DL — SIGNIFICANT CHANGE UP (ref 0.5–1.3)
CREAT SERPL-MCNC: 0.71 MG/DL
EOSINOPHIL # BLD AUTO: 0.03 K/UL — SIGNIFICANT CHANGE UP (ref 0–0.5)
EOSINOPHIL # BLD AUTO: 0.11 K/UL
EOSINOPHIL NFR BLD AUTO: 0.2 % — SIGNIFICANT CHANGE UP (ref 0–6)
EOSINOPHIL NFR BLD AUTO: 1.3 %
GLUCOSE SERPL-MCNC: 102 MG/DL
GLUCOSE SERPL-MCNC: 161 MG/DL — HIGH (ref 70–99)
HCT VFR BLD CALC: 39.1 % — SIGNIFICANT CHANGE UP (ref 34.5–45)
HCT VFR BLD CALC: 42.7 %
HGB BLD-MCNC: 12.6 G/DL — SIGNIFICANT CHANGE UP (ref 11.5–15.5)
HGB BLD-MCNC: 13.6 G/DL
IMM GRANULOCYTES NFR BLD AUTO: 0.2 %
IMM GRANULOCYTES NFR BLD AUTO: 0.6 % — SIGNIFICANT CHANGE UP (ref 0–1.5)
INR BLD: 1.09 — SIGNIFICANT CHANGE UP (ref 0.88–1.16)
INR PPP: 1.03
LACTATE SERPL-SCNC: 2.1 MMOL/L — HIGH (ref 0.5–2)
LDH SERPL L TO P-CCNC: 282 U/L — HIGH (ref 50–242)
LYMPHOCYTES # BLD AUTO: 10.2 % — LOW (ref 13–44)
LYMPHOCYTES # BLD AUTO: 2.04 K/UL — SIGNIFICANT CHANGE UP (ref 1–3.3)
LYMPHOCYTES # BLD AUTO: 2.72 K/UL
LYMPHOCYTES NFR BLD AUTO: 31.4 %
MAGNESIUM SERPL-MCNC: 2 MG/DL — SIGNIFICANT CHANGE UP (ref 1.6–2.6)
MAN DIFF?: NORMAL
MCHC RBC-ENTMCNC: 29.2 PG
MCHC RBC-ENTMCNC: 29.7 PG — SIGNIFICANT CHANGE UP (ref 27–34)
MCHC RBC-ENTMCNC: 31.9 GM/DL
MCHC RBC-ENTMCNC: 32.2 GM/DL — SIGNIFICANT CHANGE UP (ref 32–36)
MCV RBC AUTO: 91.6 FL
MCV RBC AUTO: 92.2 FL — SIGNIFICANT CHANGE UP (ref 80–100)
MONOCYTES # BLD AUTO: 0.67 K/UL — SIGNIFICANT CHANGE UP (ref 0–0.9)
MONOCYTES # BLD AUTO: 0.68 K/UL
MONOCYTES NFR BLD AUTO: 3.4 % — SIGNIFICANT CHANGE UP (ref 2–14)
MONOCYTES NFR BLD AUTO: 7.8 %
NEUTROPHILS # BLD AUTO: 17.08 K/UL — HIGH (ref 1.8–7.4)
NEUTROPHILS # BLD AUTO: 5.11 K/UL
NEUTROPHILS NFR BLD AUTO: 59 %
NEUTROPHILS NFR BLD AUTO: 85.3 % — HIGH (ref 43–77)
NRBC # BLD: 0 /100 WBCS — SIGNIFICANT CHANGE UP (ref 0–0)
PHOSPHATE SERPL-MCNC: 3.3 MG/DL — SIGNIFICANT CHANGE UP (ref 2.5–4.5)
PLATELET # BLD AUTO: 253 K/UL — SIGNIFICANT CHANGE UP (ref 150–400)
PLATELET # BLD AUTO: 279 K/UL
POTASSIUM SERPL-MCNC: 4.4 MMOL/L — SIGNIFICANT CHANGE UP (ref 3.5–5.3)
POTASSIUM SERPL-SCNC: 4.4 MMOL/L
POTASSIUM SERPL-SCNC: 4.4 MMOL/L — SIGNIFICANT CHANGE UP (ref 3.5–5.3)
PROT SERPL-MCNC: 7.3 G/DL
PROTHROM AB SERPL-ACNC: 12.4 SEC — SIGNIFICANT CHANGE UP (ref 10–12.9)
PT BLD: 11.7 SEC
RBC # BLD: 4.24 M/UL — SIGNIFICANT CHANGE UP (ref 3.8–5.2)
RBC # BLD: 4.66 M/UL
RBC # FLD: 12.7 % — SIGNIFICANT CHANGE UP (ref 10.3–14.5)
RBC # FLD: 12.9 %
SODIUM SERPL-SCNC: 138 MMOL/L — SIGNIFICANT CHANGE UP (ref 135–145)
SODIUM SERPL-SCNC: 142 MMOL/L
WBC # BLD: 20 K/UL — HIGH (ref 3.8–10.5)
WBC # FLD AUTO: 20 K/UL — HIGH (ref 3.8–10.5)
WBC # FLD AUTO: 8.67 K/UL

## 2019-05-01 PROCEDURE — 99232 SBSQ HOSP IP/OBS MODERATE 35: CPT | Mod: GC

## 2019-05-01 PROCEDURE — 38120 LAPAROSCOPY SPLENECTOMY: CPT | Mod: 82,59

## 2019-05-01 PROCEDURE — S2900 ROBOTIC SURGICAL SYSTEM: CPT | Mod: NC

## 2019-05-01 PROCEDURE — 48140 PARTIAL REMOVAL OF PANCREAS: CPT | Mod: 82

## 2019-05-01 RX ORDER — ASPIRIN/CALCIUM CARB/MAGNESIUM 324 MG
1 TABLET ORAL
Qty: 0 | Refills: 0 | COMMUNITY

## 2019-05-01 RX ORDER — ALBUTEROL 90 UG/1
2 AEROSOL, METERED ORAL EVERY 6 HOURS
Qty: 0 | Refills: 0 | Status: DISCONTINUED | OUTPATIENT
Start: 2019-05-01 | End: 2019-05-06

## 2019-05-01 RX ORDER — ALBUTEROL 90 UG/1
2 AEROSOL, METERED ORAL
Qty: 0 | Refills: 0 | COMMUNITY

## 2019-05-01 RX ORDER — HEPARIN SODIUM 5000 [USP'U]/ML
5000 INJECTION INTRAVENOUS; SUBCUTANEOUS EVERY 8 HOURS
Qty: 0 | Refills: 0 | Status: DISCONTINUED | OUTPATIENT
Start: 2019-05-01 | End: 2019-05-06

## 2019-05-01 RX ORDER — BUPIVACAINE 13.3 MG/ML
20 INJECTION, SUSPENSION, LIPOSOMAL INFILTRATION ONCE
Qty: 0 | Refills: 0 | Status: DISCONTINUED | OUTPATIENT
Start: 2019-05-01 | End: 2019-05-02

## 2019-05-01 RX ORDER — HYDROMORPHONE HYDROCHLORIDE 2 MG/ML
0.3 INJECTION INTRAMUSCULAR; INTRAVENOUS; SUBCUTANEOUS
Qty: 0 | Refills: 0 | Status: DISCONTINUED | OUTPATIENT
Start: 2019-05-01 | End: 2019-05-01

## 2019-05-01 RX ORDER — SODIUM CHLORIDE 9 MG/ML
1000 INJECTION, SOLUTION INTRAVENOUS
Qty: 0 | Refills: 0 | Status: DISCONTINUED | OUTPATIENT
Start: 2019-05-01 | End: 2019-05-03

## 2019-05-01 RX ORDER — HYDROMORPHONE HYDROCHLORIDE 2 MG/ML
0.5 INJECTION INTRAMUSCULAR; INTRAVENOUS; SUBCUTANEOUS EVERY 6 HOURS
Qty: 0 | Refills: 0 | Status: DISCONTINUED | OUTPATIENT
Start: 2019-05-01 | End: 2019-05-02

## 2019-05-01 RX ORDER — HYDROMORPHONE HYDROCHLORIDE 2 MG/ML
1 INJECTION INTRAMUSCULAR; INTRAVENOUS; SUBCUTANEOUS EVERY 6 HOURS
Qty: 0 | Refills: 0 | Status: DISCONTINUED | OUTPATIENT
Start: 2019-05-01 | End: 2019-05-02

## 2019-05-01 RX ORDER — HEPARIN SODIUM 5000 [USP'U]/ML
5000 INJECTION INTRAVENOUS; SUBCUTANEOUS EVERY 12 HOURS
Qty: 0 | Refills: 0 | Status: DISCONTINUED | OUTPATIENT
Start: 2019-05-01 | End: 2019-05-01

## 2019-05-01 RX ORDER — ONDANSETRON 8 MG/1
4 TABLET, FILM COATED ORAL EVERY 4 HOURS
Qty: 0 | Refills: 0 | Status: DISCONTINUED | OUTPATIENT
Start: 2019-05-01 | End: 2019-05-06

## 2019-05-01 RX ADMIN — HYDROMORPHONE HYDROCHLORIDE 0.3 MILLIGRAM(S): 2 INJECTION INTRAMUSCULAR; INTRAVENOUS; SUBCUTANEOUS at 13:24

## 2019-05-01 RX ADMIN — HYDROMORPHONE HYDROCHLORIDE 0.5 MILLIGRAM(S): 2 INJECTION INTRAMUSCULAR; INTRAVENOUS; SUBCUTANEOUS at 22:50

## 2019-05-01 RX ADMIN — HYDROMORPHONE HYDROCHLORIDE 0.5 MILLIGRAM(S): 2 INJECTION INTRAMUSCULAR; INTRAVENOUS; SUBCUTANEOUS at 16:18

## 2019-05-01 RX ADMIN — HEPARIN SODIUM 5000 UNIT(S): 5000 INJECTION INTRAVENOUS; SUBCUTANEOUS at 21:29

## 2019-05-01 RX ADMIN — HYDROMORPHONE HYDROCHLORIDE 0.3 MILLIGRAM(S): 2 INJECTION INTRAMUSCULAR; INTRAVENOUS; SUBCUTANEOUS at 14:22

## 2019-05-01 RX ADMIN — HYDROMORPHONE HYDROCHLORIDE 0.3 MILLIGRAM(S): 2 INJECTION INTRAMUSCULAR; INTRAVENOUS; SUBCUTANEOUS at 12:30

## 2019-05-01 RX ADMIN — HYDROMORPHONE HYDROCHLORIDE 0.3 MILLIGRAM(S): 2 INJECTION INTRAMUSCULAR; INTRAVENOUS; SUBCUTANEOUS at 11:54

## 2019-05-01 RX ADMIN — HYDROMORPHONE HYDROCHLORIDE 0.5 MILLIGRAM(S): 2 INJECTION INTRAMUSCULAR; INTRAVENOUS; SUBCUTANEOUS at 22:29

## 2019-05-01 RX ADMIN — HEPARIN SODIUM 5000 UNIT(S): 5000 INJECTION INTRAVENOUS; SUBCUTANEOUS at 14:24

## 2019-05-01 RX ADMIN — HYDROMORPHONE HYDROCHLORIDE 0.5 MILLIGRAM(S): 2 INJECTION INTRAMUSCULAR; INTRAVENOUS; SUBCUTANEOUS at 16:30

## 2019-05-01 NOTE — H&P ADULT - NSHPPHYSICALEXAM_GEN_ALL_CORE
Vital Signs Last 24 Hrs  T(C): 36.3 (01 May 2019 11:37), Max: 36.7 (30 Apr 2019 13:44)  T(F): 97.3 (01 May 2019 11:37), Max: 98.1 (30 Apr 2019 13:44)  HR: 72 (01 May 2019 12:29) (70 - 78)  BP: 127/59 (01 May 2019 12:29) (121/60 - 141/67)  BP(mean): 84 (01 May 2019 12:29) (81 - 97)  RR: 21 (01 May 2019 12:29) (18 - 28)  SpO2: 98% (01 May 2019 12:29) (97% - 100%)    Physical Exam:   Gen: AOx3, pleasant in NAD  Cor: RRR, +S1S2, no MRG  Pulm: CTA b/l No W/R/S  Abd: +BS, Soft, non distended, TTP in RLQ with localized guarding, +Rovsings  Ext: WWP, non edematous, DP +2 b/l

## 2019-05-01 NOTE — BRIEF OPERATIVE NOTE - OPERATION/FINDINGS
5 cm pancreatic cyst identified robotically and under ultrasound.  Body of pancreas and splenic artery and vein transected with 60 mm purple stapler x 2.

## 2019-05-01 NOTE — H&P ADULT - NSHPLABSRESULTS_GEN_ALL_CORE
Vital Signs Last 24 Hrs  T(C): 36.3 (01 May 2019 11:37), Max: 36.7 (30 Apr 2019 13:44)  T(F): 97.3 (01 May 2019 11:37), Max: 98.1 (30 Apr 2019 13:44)  HR: 72 (01 May 2019 12:29) (70 - 78)  BP: 127/59 (01 May 2019 12:29) (121/60 - 141/67)  BP(mean): 84 (01 May 2019 12:29) (81 - 97)  RR: 21 (01 May 2019 12:29) (18 - 28)  SpO2: 98% (01 May 2019 12:29) (97% - 100%)    Physical Exam:   Gen: AOx3, pleasant in NAD  Cor: RRR, +S1S2, no MRG  Pulm: CTA b/l No W/R/S  Abd: +BS, Soft, non distended, NTTP  Ext: WWP, non edematous, DP +2 b/l

## 2019-05-01 NOTE — BRIEF OPERATIVE NOTE - NSICDXBRIEFPROCEDURE_GEN_ALL_CORE_FT
PROCEDURES:  Robot-assisted laparoscopic splenectomy 01-May-2019 11:08:22  Leslee Quesada  Pancreatectomy, distal, robot-assisted, using da Dwight Xi 01-May-2019 11:08:01  Leslee Quesada

## 2019-05-01 NOTE — H&P ADULT - HISTORY OF PRESENT ILLNESS
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 Here today for an elective resection of her pancreas, possible spleen

## 2019-05-01 NOTE — PROGRESS NOTE ADULT - SUBJECTIVE AND OBJECTIVE BOX
Team 2  Surgery Post-Op Note, PCN:     Pre-Op Dx: Pancreatic cyst  Procedure: Robot-assisted laparoscopic splenectomy  Pancreatectomy, distal, robot-assisted, using da Dwight Xi    Surgeon: Nico    Subjective: Pt seen and examined at bedside. Afebrile, VSS. Abdominal pain is well controlled. Denies N/V. Denies chest pain/dyspnea. Patient has made 500cc UO via kaplan catheter.      Vital Signs Last 24 Hrs  T(C): 36.3 (01 May 2019 11:37), Max: 36.7 (30 Apr 2019 13:44)  T(F): 97.3 (01 May 2019 11:37), Max: 98.1 (30 Apr 2019 13:44)  HR: 70 (01 May 2019 12:59) (70 - 78)  BP: 123/58 (01 May 2019 12:59) (121/60 - 141/67)  BP(mean): 78 (01 May 2019 12:59) (72 - 97)  RR: 17 (01 May 2019 12:59) (17 - 28)  SpO2: 98% (01 May 2019 12:59) (97% - 100%)    Physical Exam:  General: NAD, resting comfortably in bed  Neuro: A/O x 3, CNs II-XII grossly intact, no focal deficits, normal sensation  Pulmonary: CLAB, no rhonchi, Nonlabored breathing, no respiratory distress  Cardiovascular: S1/S2 normal, no murmurs appreciated  Abdominal: soft, mild distention, appropriately tender. Inc C/D/I. L MITZI draining SS  Extremities: WWP, normal strength  Pulses: palpable distal pulses      LABS:                        12.6   20.00 )-----------( 253      ( 01 May 2019 11:56 )             39.1     05-01    138  |  102  |  11  ----------------------------<  161<H>  4.4   |  24  |  0.62    Ca    8.7      01 May 2019 11:56  Phos  3.3     05-01  Mg     2.0     05-01      PT/INR - ( 01 May 2019 11:56 )   PT: 12.4 sec;   INR: 1.09          PTT - ( 01 May 2019 11:56 )  PTT:28.3 sec  CAPILLARY BLOOD GLUCOSE            ABO Interpretation: O (05-01 @ 07:23)        Radiology and Additional Studies:    Assessment:79y Female s/p above procedure    Plan:  Pain/nausea control PRN  Home meds  Incentive spirometer/OOB/Ambulate  NPO w/sips, IVF  HSQ/SCDs for DVT prophylaxis  AM labs  ESTEVAN Kaplan in am

## 2019-05-01 NOTE — H&P ADULT - ASSESSMENT
79F pt with PMH Asthma/prior smoker, nephrolithiasis, known IPMN with malignant degeneration here for elective resection  of her distal pancreas and spleen    -Admit to Dr. Walsh  -Post op care, with pain control, sips/chips, DVT ppx, IVF

## 2019-05-01 NOTE — PROGRESS NOTE ADULT - SUBJECTIVE AND OBJECTIVE BOX
Interval Events: Reviewed  Patient seen and examined at bedside.    Patient is a 79y old  Female who presents with a chief complaint of pancreatic tail mass (02 May 2019 07:01)    she is doing well and pain is controlled  PAST MEDICAL & SURGICAL HISTORY:  History of kidney stones  Asthma  History of lithotripsy      MEDICATIONS:  Pulmonary:  ALBUTerol    90 MICROgram(s) HFA Inhaler 2 Puff(s) Inhalation every 6 hours PRN    Antimicrobials:    Anticoagulants:  heparin  Injectable 5000 Unit(s) SubCutaneous every 8 hours    Cardiac:      Allergies    No Known Allergies    Intolerances        Vital Signs Last 24 Hrs  T(C): 37 (02 May 2019 17:57), Max: 37.8 (02 May 2019 05:20)  T(F): 98.6 (02 May 2019 17:57), Max: 100 (02 May 2019 05:20)  HR: 82 (02 May 2019 12:00) (76 - 98)  BP: 140/63 (02 May 2019 12:00) (133/83 - 158/67)  BP(mean): 90 (02 May 2019 12:00) (87 - 101)  RR: 16 (02 May 2019 12:00) (14 - 25)  SpO2: 98% (02 May 2019 12:00) (93% - 98%)    05-01 @ 07:01  -  05-02 @ 07:00  --------------------------------------------------------  IN: 1275 mL / OUT: 1235 mL / NET: 40 mL    05-02 @ 07:01  -  05-02 @ 17:59  --------------------------------------------------------  IN: 680 mL / OUT: 770 mL / NET: -90 mL          LABS:      CBC Full  -  ( 02 May 2019 06:17 )  WBC Count : 13.55 K/uL  RBC Count : 4.06 M/uL  Hemoglobin : 11.9 g/dL  Hematocrit : 38.0 %  Platelet Count - Automated : 238 K/uL  Mean Cell Volume : 93.6 fl  Mean Cell Hemoglobin : 29.3 pg  Mean Cell Hemoglobin Concentration : 31.3 gm/dL  Auto Neutrophil # : x  Auto Lymphocyte # : x  Auto Monocyte # : x  Auto Eosinophil # : x  Auto Basophil # : x  Auto Neutrophil % : x  Auto Lymphocyte % : x  Auto Monocyte % : x  Auto Eosinophil % : x  Auto Basophil % : x    05-02    135  |  99  |  13  ----------------------------<  143<H>  4.1   |  26  |  0.66    Ca    8.9      02 May 2019 06:17  Phos  3.8     05-02  Mg     2.0     05-02    TPro  6.4  /  Alb  3.1<L>  /  TBili  0.2  /  DBili  x   /  AST  44<H>  /  ALT  56<H>  /  AlkPhos  75  05-02    PT/INR - ( 01 May 2019 11:56 )   PT: 12.4 sec;   INR: 1.09          PTT - ( 01 May 2019 11:56 )  PTT:28.3 sec                    RADIOLOGY & ADDITIONAL STUDIES (The following images were personally reviewed):  Williamson:                                     No  Urine output:                       adequate  DVT prophylaxis:                 Yes  Flattus:                                  Yes  Bowel movement:              No

## 2019-05-02 DIAGNOSIS — R91.8 OTHER NONSPECIFIC ABNORMAL FINDING OF LUNG FIELD: ICD-10-CM

## 2019-05-02 DIAGNOSIS — Z98.890 OTHER SPECIFIED POSTPROCEDURAL STATES: ICD-10-CM

## 2019-05-02 DIAGNOSIS — J45.30 MILD PERSISTENT ASTHMA, UNCOMPLICATED: ICD-10-CM

## 2019-05-02 LAB
ALBUMIN SERPL ELPH-MCNC: 3.1 G/DL — LOW (ref 3.3–5)
ALP SERPL-CCNC: 75 U/L — SIGNIFICANT CHANGE UP (ref 40–120)
ALT FLD-CCNC: 56 U/L — HIGH (ref 10–45)
ANION GAP SERPL CALC-SCNC: 10 MMOL/L — SIGNIFICANT CHANGE UP (ref 5–17)
AST SERPL-CCNC: 44 U/L — HIGH (ref 10–40)
BILIRUB SERPL-MCNC: 0.2 MG/DL — SIGNIFICANT CHANGE UP (ref 0.2–1.2)
BUN SERPL-MCNC: 13 MG/DL — SIGNIFICANT CHANGE UP (ref 7–23)
CALCIUM SERPL-MCNC: 8.9 MG/DL — SIGNIFICANT CHANGE UP (ref 8.4–10.5)
CHLORIDE SERPL-SCNC: 99 MMOL/L — SIGNIFICANT CHANGE UP (ref 96–108)
CO2 SERPL-SCNC: 26 MMOL/L — SIGNIFICANT CHANGE UP (ref 22–31)
CREAT SERPL-MCNC: 0.66 MG/DL — SIGNIFICANT CHANGE UP (ref 0.5–1.3)
GLUCOSE SERPL-MCNC: 143 MG/DL — HIGH (ref 70–99)
HCT VFR BLD CALC: 38 % — SIGNIFICANT CHANGE UP (ref 34.5–45)
HGB BLD-MCNC: 11.9 G/DL — SIGNIFICANT CHANGE UP (ref 11.5–15.5)
MAGNESIUM SERPL-MCNC: 2 MG/DL — SIGNIFICANT CHANGE UP (ref 1.6–2.6)
MCHC RBC-ENTMCNC: 29.3 PG — SIGNIFICANT CHANGE UP (ref 27–34)
MCHC RBC-ENTMCNC: 31.3 GM/DL — LOW (ref 32–36)
MCV RBC AUTO: 93.6 FL — SIGNIFICANT CHANGE UP (ref 80–100)
NRBC # BLD: 0 /100 WBCS — SIGNIFICANT CHANGE UP (ref 0–0)
PHOSPHATE SERPL-MCNC: 3.8 MG/DL — SIGNIFICANT CHANGE UP (ref 2.5–4.5)
PLATELET # BLD AUTO: 238 K/UL — SIGNIFICANT CHANGE UP (ref 150–400)
POTASSIUM SERPL-MCNC: 4.1 MMOL/L — SIGNIFICANT CHANGE UP (ref 3.5–5.3)
POTASSIUM SERPL-SCNC: 4.1 MMOL/L — SIGNIFICANT CHANGE UP (ref 3.5–5.3)
PROT SERPL-MCNC: 6.4 G/DL — SIGNIFICANT CHANGE UP (ref 6–8.3)
RBC # BLD: 4.06 M/UL — SIGNIFICANT CHANGE UP (ref 3.8–5.2)
RBC # FLD: 13 % — SIGNIFICANT CHANGE UP (ref 10.3–14.5)
SODIUM SERPL-SCNC: 135 MMOL/L — SIGNIFICANT CHANGE UP (ref 135–145)
WBC # BLD: 13.55 K/UL — HIGH (ref 3.8–10.5)
WBC # FLD AUTO: 13.55 K/UL — HIGH (ref 3.8–10.5)

## 2019-05-02 PROCEDURE — 99232 SBSQ HOSP IP/OBS MODERATE 35: CPT | Mod: GC

## 2019-05-02 RX ORDER — OXYCODONE AND ACETAMINOPHEN 5; 325 MG/1; MG/1
2 TABLET ORAL EVERY 4 HOURS
Qty: 0 | Refills: 0 | Status: DISCONTINUED | OUTPATIENT
Start: 2019-05-02 | End: 2019-05-06

## 2019-05-02 RX ORDER — OXYCODONE AND ACETAMINOPHEN 5; 325 MG/1; MG/1
1 TABLET ORAL EVERY 4 HOURS
Qty: 0 | Refills: 0 | Status: DISCONTINUED | OUTPATIENT
Start: 2019-05-02 | End: 2019-05-06

## 2019-05-02 RX ADMIN — OXYCODONE AND ACETAMINOPHEN 1 TABLET(S): 5; 325 TABLET ORAL at 22:15

## 2019-05-02 RX ADMIN — HEPARIN SODIUM 5000 UNIT(S): 5000 INJECTION INTRAVENOUS; SUBCUTANEOUS at 15:04

## 2019-05-02 RX ADMIN — HYDROMORPHONE HYDROCHLORIDE 0.5 MILLIGRAM(S): 2 INJECTION INTRAMUSCULAR; INTRAVENOUS; SUBCUTANEOUS at 06:41

## 2019-05-02 RX ADMIN — HEPARIN SODIUM 5000 UNIT(S): 5000 INJECTION INTRAVENOUS; SUBCUTANEOUS at 05:57

## 2019-05-02 RX ADMIN — HYDROMORPHONE HYDROCHLORIDE 0.5 MILLIGRAM(S): 2 INJECTION INTRAMUSCULAR; INTRAVENOUS; SUBCUTANEOUS at 05:57

## 2019-05-02 RX ADMIN — HYDROMORPHONE HYDROCHLORIDE 0.5 MILLIGRAM(S): 2 INJECTION INTRAMUSCULAR; INTRAVENOUS; SUBCUTANEOUS at 15:34

## 2019-05-02 RX ADMIN — HYDROMORPHONE HYDROCHLORIDE 0.5 MILLIGRAM(S): 2 INJECTION INTRAMUSCULAR; INTRAVENOUS; SUBCUTANEOUS at 15:04

## 2019-05-02 RX ADMIN — HEPARIN SODIUM 5000 UNIT(S): 5000 INJECTION INTRAVENOUS; SUBCUTANEOUS at 21:15

## 2019-05-02 RX ADMIN — OXYCODONE AND ACETAMINOPHEN 1 TABLET(S): 5; 325 TABLET ORAL at 21:15

## 2019-05-02 NOTE — PROGRESS NOTE ADULT - SUBJECTIVE AND OBJECTIVE BOX
ID: 79F PMH Asthma/prior smoker, nephrolithiasis, known IPMN with malignant degeneration s/p elective Robotic assisted lap distal pancreatectomy and splenecomty (5/1)      SUBJECTIVE: Having hiccups      OBJECTIVE:    Vital Signs:  Vital Signs Last 24 Hrs  T(C): 37.8 (02 May 2019 05:20), Max: 37.8 (02 May 2019 05:20)  T(F): 100 (02 May 2019 05:20), Max: 100 (02 May 2019 05:20)  HR: 76 (02 May 2019 04:45) (70 - 88)  BP: 135/63 (02 May 2019 04:45) (115/54 - 158/67)  BP(mean): 90 (02 May 2019 04:45) (72 - 101)  RR: 14 (02 May 2019 04:45) (14 - 28)  SpO2: 98% (02 May 2019 04:45) (94% - 100%)    Physical Exam:  General: NAD  Pulmonary: Nonlabored breathing, no respiratory distress  Cardiovascular: No heaves/thrills  Abdominal: Soft, appropriately tender, distended, MITZI serosanguinous  : Williamson draining yellow urine  Extremities: WWP, no clubbing/cyanosis/edema  Neuro: AAO x3, no focal deficits    Lines/Drains/Tubes:    Ins and Outs:  I&O's Summary    01 May 2019 07:01  -  02 May 2019 07:00  --------------------------------------------------------  IN: 1105 mL / OUT: 1235 mL / NET: -130 mL        Labs:                        11.9   13.55 )-----------( 238      ( 02 May 2019 06:17 )             38.0     05-01    138  |  102  |  11  ----------------------------<  161<H>  4.4   |  24  |  0.62    Ca    8.7      01 May 2019 11:56  Phos  3.3     05-01  Mg     2.0     05-01      PT/INR - ( 01 May 2019 11:56 )   PT: 12.4 sec;   INR: 1.09          PTT - ( 01 May 2019 11:56 )  PTT:28.3 sec    CAPILLARY BLOOD GLUCOSE            Radiology & Additional Studies:

## 2019-05-02 NOTE — PROGRESS NOTE ADULT - ASSESSMENT
A/P: 79F PMH Asthma/prior smoker, nephrolithiasis, known IPMN with malignant degeneration s/p elective Robotic assisted lap distal pancreatectomy and splenecomty (5/1). Will continue care and consider removing Williamson.    Pain/Nausea control prn  NPO w/sips, IVF  JPX1 is19F in LUQ near panc stump  HSQ/SCDs for DVT ppx  OOBA/IS  AM labs

## 2019-05-02 NOTE — PHYSICAL THERAPY INITIAL EVALUATION ADULT - ADDITIONAL COMMENTS
Pt lives with her sister in an elevator access apt. At baseline, ambulates independently with no DME. Pt reports she is very active, always shopping and going dancing once a month. Reports that she walks up the stairs to her 2nd floor apt unless she is carrying groceries to stay active.

## 2019-05-02 NOTE — PROGRESS NOTE ADULT - SUBJECTIVE AND OBJECTIVE BOX
Interval Events: Reviewed  Patient seen and examined at bedside.    Patient is a 79y old  Female who presents with a chief complaint of pancreatic tail mass (02 May 2019 07:01)    she is doing well and walked.  No gas yet  PAST MEDICAL & SURGICAL HISTORY:  History of kidney stones  Asthma  History of lithotripsy      MEDICATIONS:  Pulmonary:  ALBUTerol    90 MICROgram(s) HFA Inhaler 2 Puff(s) Inhalation every 6 hours PRN    Antimicrobials:    Anticoagulants:  heparin  Injectable 5000 Unit(s) SubCutaneous every 8 hours    Cardiac:      Allergies    No Known Allergies    Intolerances        Vital Signs Last 24 Hrs  T(C): 37 (02 May 2019 17:57), Max: 37.8 (02 May 2019 05:20)  T(F): 98.6 (02 May 2019 17:57), Max: 100 (02 May 2019 05:20)  HR: 82 (02 May 2019 12:00) (76 - 98)  BP: 140/63 (02 May 2019 12:00) (133/83 - 158/67)  BP(mean): 90 (02 May 2019 12:00) (87 - 101)  RR: 16 (02 May 2019 12:00) (14 - 25)  SpO2: 98% (02 May 2019 12:00) (93% - 98%)    05-01 @ 07:01  -  05-02 @ 07:00  --------------------------------------------------------  IN: 1275 mL / OUT: 1235 mL / NET: 40 mL    05-02 @ 07:01  -  05-02 @ 18:10  --------------------------------------------------------  IN: 680 mL / OUT: 770 mL / NET: -90 mL          LABS:      CBC Full  -  ( 02 May 2019 06:17 )  WBC Count : 13.55 K/uL  RBC Count : 4.06 M/uL  Hemoglobin : 11.9 g/dL  Hematocrit : 38.0 %  Platelet Count - Automated : 238 K/uL  Mean Cell Volume : 93.6 fl  Mean Cell Hemoglobin : 29.3 pg  Mean Cell Hemoglobin Concentration : 31.3 gm/dL  Auto Neutrophil # : x  Auto Lymphocyte # : x  Auto Monocyte # : x  Auto Eosinophil # : x  Auto Basophil # : x  Auto Neutrophil % : x  Auto Lymphocyte % : x  Auto Monocyte % : x  Auto Eosinophil % : x  Auto Basophil % : x    05-02    135  |  99  |  13  ----------------------------<  143<H>  4.1   |  26  |  0.66    Ca    8.9      02 May 2019 06:17  Phos  3.8     05-02  Mg     2.0     05-02    TPro  6.4  /  Alb  3.1<L>  /  TBili  0.2  /  DBili  x   /  AST  44<H>  /  ALT  56<H>  /  AlkPhos  75  05-02    PT/INR - ( 01 May 2019 11:56 )   PT: 12.4 sec;   INR: 1.09          PTT - ( 01 May 2019 11:56 )  PTT:28.3 sec                    RADIOLOGY & ADDITIONAL STUDIES (The following images were personally reviewed):  Williamson:                                     No  Urine output:                       adequate  DVT prophylaxis:                 Yes  Flattus:                                  Yes  Bowel movement:              No

## 2019-05-02 NOTE — PHYSICAL THERAPY INITIAL EVALUATION ADULT - DIAGNOSIS, PT EVAL
Impaired Joint Mobility, Motor Function, Muscle Performance, and Range of Motion Associated with Bony or Soft Tissue Surgery.

## 2019-05-02 NOTE — PHYSICAL THERAPY INITIAL EVALUATION ADULT - DID THE PATIENT HAVE SURGERY?
yes/Robot-assisted laparoscopic splenectomy, Pancreatectomy, distal, robot-assisted, using da Dwight Xi

## 2019-05-02 NOTE — PHYSICAL THERAPY INITIAL EVALUATION ADULT - PERTINENT HX OF CURRENT PROBLEM, REHAB EVAL
79F PMH Asthma/prior smoker, nephrolithiasis, known IPMN with malignant degeneration s/p elective Robotic assisted lap distal pancreatectomy and splenectomy (5/1).

## 2019-05-02 NOTE — PHYSICAL THERAPY INITIAL EVALUATION ADULT - GENERAL OBSERVATIONS, REHAB EVAL
Pt received supine, +JPx1, +abdominal incisions C/D/I, +telemetry, +pulse ox, +IV, NAD, agreeable to PT.

## 2019-05-03 LAB
ANION GAP SERPL CALC-SCNC: 11 MMOL/L — SIGNIFICANT CHANGE UP (ref 5–17)
BUN SERPL-MCNC: 9 MG/DL — SIGNIFICANT CHANGE UP (ref 7–23)
CALCIUM SERPL-MCNC: 8.6 MG/DL — SIGNIFICANT CHANGE UP (ref 8.4–10.5)
CHLORIDE SERPL-SCNC: 102 MMOL/L — SIGNIFICANT CHANGE UP (ref 96–108)
CO2 SERPL-SCNC: 27 MMOL/L — SIGNIFICANT CHANGE UP (ref 22–31)
CREAT SERPL-MCNC: 0.62 MG/DL — SIGNIFICANT CHANGE UP (ref 0.5–1.3)
GLUCOSE SERPL-MCNC: 126 MG/DL — HIGH (ref 70–99)
HCT VFR BLD CALC: 38.2 % — SIGNIFICANT CHANGE UP (ref 34.5–45)
HGB BLD-MCNC: 12 G/DL — SIGNIFICANT CHANGE UP (ref 11.5–15.5)
MAGNESIUM SERPL-MCNC: 1.8 MG/DL — SIGNIFICANT CHANGE UP (ref 1.6–2.6)
MCHC RBC-ENTMCNC: 29.2 PG — SIGNIFICANT CHANGE UP (ref 27–34)
MCHC RBC-ENTMCNC: 31.4 GM/DL — LOW (ref 32–36)
MCV RBC AUTO: 92.9 FL — SIGNIFICANT CHANGE UP (ref 80–100)
NRBC # BLD: 0 /100 WBCS — SIGNIFICANT CHANGE UP (ref 0–0)
PHOSPHATE SERPL-MCNC: 2 MG/DL — LOW (ref 2.5–4.5)
PLATELET # BLD AUTO: 236 K/UL — SIGNIFICANT CHANGE UP (ref 150–400)
POTASSIUM SERPL-MCNC: 3.6 MMOL/L — SIGNIFICANT CHANGE UP (ref 3.5–5.3)
POTASSIUM SERPL-SCNC: 3.6 MMOL/L — SIGNIFICANT CHANGE UP (ref 3.5–5.3)
RBC # BLD: 4.11 M/UL — SIGNIFICANT CHANGE UP (ref 3.8–5.2)
RBC # FLD: 13.2 % — SIGNIFICANT CHANGE UP (ref 10.3–14.5)
SODIUM SERPL-SCNC: 140 MMOL/L — SIGNIFICANT CHANGE UP (ref 135–145)
SURGICAL PATHOLOGY STUDY: SIGNIFICANT CHANGE UP
WBC # BLD: 18.28 K/UL — HIGH (ref 3.8–10.5)
WBC # FLD AUTO: 18.28 K/UL — HIGH (ref 3.8–10.5)

## 2019-05-03 PROCEDURE — 99232 SBSQ HOSP IP/OBS MODERATE 35: CPT | Mod: GC

## 2019-05-03 RX ORDER — POTASSIUM PHOSPHATE, MONOBASIC POTASSIUM PHOSPHATE, DIBASIC 236; 224 MG/ML; MG/ML
15 INJECTION, SOLUTION INTRAVENOUS ONCE
Qty: 0 | Refills: 0 | Status: COMPLETED | OUTPATIENT
Start: 2019-05-03 | End: 2019-05-03

## 2019-05-03 RX ORDER — POTASSIUM CHLORIDE 20 MEQ
40 PACKET (EA) ORAL ONCE
Qty: 0 | Refills: 0 | Status: COMPLETED | OUTPATIENT
Start: 2019-05-03 | End: 2019-05-03

## 2019-05-03 RX ORDER — MAGNESIUM SULFATE 500 MG/ML
1 VIAL (ML) INJECTION ONCE
Qty: 0 | Refills: 0 | Status: COMPLETED | OUTPATIENT
Start: 2019-05-03 | End: 2019-05-03

## 2019-05-03 RX ORDER — DEXTROSE MONOHYDRATE, SODIUM CHLORIDE, AND POTASSIUM CHLORIDE 50; .745; 4.5 G/1000ML; G/1000ML; G/1000ML
1000 INJECTION, SOLUTION INTRAVENOUS
Qty: 0 | Refills: 0 | Status: DISCONTINUED | OUTPATIENT
Start: 2019-05-03 | End: 2019-05-03

## 2019-05-03 RX ADMIN — HEPARIN SODIUM 5000 UNIT(S): 5000 INJECTION INTRAVENOUS; SUBCUTANEOUS at 06:33

## 2019-05-03 RX ADMIN — OXYCODONE AND ACETAMINOPHEN 1 TABLET(S): 5; 325 TABLET ORAL at 14:13

## 2019-05-03 RX ADMIN — OXYCODONE AND ACETAMINOPHEN 1 TABLET(S): 5; 325 TABLET ORAL at 15:09

## 2019-05-03 RX ADMIN — Medication 40 MILLIEQUIVALENT(S): at 09:03

## 2019-05-03 RX ADMIN — OXYCODONE AND ACETAMINOPHEN 1 TABLET(S): 5; 325 TABLET ORAL at 22:30

## 2019-05-03 RX ADMIN — HEPARIN SODIUM 5000 UNIT(S): 5000 INJECTION INTRAVENOUS; SUBCUTANEOUS at 14:13

## 2019-05-03 RX ADMIN — Medication 100 GRAM(S): at 09:03

## 2019-05-03 RX ADMIN — POTASSIUM PHOSPHATE, MONOBASIC POTASSIUM PHOSPHATE, DIBASIC 63.75 MILLIMOLE(S): 236; 224 INJECTION, SOLUTION INTRAVENOUS at 11:05

## 2019-05-03 RX ADMIN — OXYCODONE AND ACETAMINOPHEN 1 TABLET(S): 5; 325 TABLET ORAL at 21:45

## 2019-05-03 RX ADMIN — DEXTROSE MONOHYDRATE, SODIUM CHLORIDE, AND POTASSIUM CHLORIDE 60 MILLILITER(S): 50; .745; 4.5 INJECTION, SOLUTION INTRAVENOUS at 18:41

## 2019-05-03 RX ADMIN — HEPARIN SODIUM 5000 UNIT(S): 5000 INJECTION INTRAVENOUS; SUBCUTANEOUS at 21:43

## 2019-05-03 NOTE — PROGRESS NOTE ADULT - PROBLEM SELECTOR PROBLEM 1
Mild persistent asthma without complication

## 2019-05-03 NOTE — PROGRESS NOTE ADULT - SUBJECTIVE AND OBJECTIVE BOX
ID: 79F PMH Asthma/prior smoker, nephrolithiasis, known IPMN with malignant degeneration s/p elective Robotic assisted lap distal pancreatectomy and splenectomy (5/1).      SUBJECTIVE: Denies dysuria; ambulating      OBJECTIVE:    Vital Signs:  Vital Signs Last 24 Hrs  T(C): 36.8 (03 May 2019 06:16), Max: 37.2 (02 May 2019 21:56)  T(F): 98.2 (03 May 2019 06:16), Max: 99 (02 May 2019 21:56)  HR: 88 (03 May 2019 04:12) (76 - 98)  BP: 150/70 (03 May 2019 04:12) (125/59 - 150/70)  BP(mean): 101 (03 May 2019 04:12) (85 - 101)  RR: 16 (03 May 2019 04:12) (16 - 25)  SpO2: 96% (03 May 2019 04:12) (93% - 98%)    Physical Exam:  General: NAD  Pulmonary: Nonlabored breathing, no respiratory distress  Cardiovascular: No heaves/thrills  Abdominal: Soft, nontender, slightly distended, incisions CDI, MITZI serosanguinous drainage  Extremities: WWP, no clubbing/cyanosis/edema  Neuro: AAO x3, no focal deficits    Lines/Drains/Tubes:    Ins and Outs:  I&O's Summary    02 May 2019 07:01  -  03 May 2019 07:00  --------------------------------------------------------  IN: 1020 mL / OUT: 1815 mL / NET: -795 mL        Labs:                        12.0   18.28 )-----------( 236      ( 03 May 2019 06:31 )             38.2     05-03    140  |  102  |  9   ----------------------------<  126<H>  3.6   |  27  |  0.62    Ca    8.6      03 May 2019 06:31  Phos  2.0     05-03  Mg     1.8     05-03    TPro  6.4  /  Alb  3.1<L>  /  TBili  0.2  /  DBili  x   /  AST  44<H>  /  ALT  56<H>  /  AlkPhos  75  05-02    PT/INR - ( 01 May 2019 11:56 )   PT: 12.4 sec;   INR: 1.09          PTT - ( 01 May 2019 11:56 )  PTT:28.3 sec    CAPILLARY BLOOD GLUCOSE        LIVER FUNCTIONS - ( 02 May 2019 06:17 )  Alb: 3.1 g/dL / Pro: 6.4 g/dL / ALK PHOS: 75 U/L / ALT: 56 U/L / AST: 44 U/L / GGT: x             Radiology & Additional Studies:

## 2019-05-03 NOTE — PROGRESS NOTE ADULT - ASSESSMENT
A/P: 79F PMH Asthma/prior smoker, nephrolithiasis, known IPMN with malignant degeneration s/p elective Robotic assisted lap distal pancreatectomy and splenectomy (5/1). Will monitor UOP and PVRs.    Pain/Nausea control prn  CLD/IVF  JPX1 18F in LUQ near panc stump  HSQ/SCDs for DVT ppx  OOBA/IS  AM labs  PT: home w/o needs

## 2019-05-03 NOTE — PROGRESS NOTE ADULT - ATTENDING COMMENTS
Patient seen and examined with house-staff during bedside rounds.  Resident note read, including vitals, physical findings, laboratory data, and radiological reports.   Revisions included below.  Direct personal management at bed side and extensive interpretation of the data.  Plan was outlined and discussed in details with the housestaff.  Decision making of high complexity  Action taken for acute disease activity to reflect the level of care provided:  - medication reconciliation  - review laboratory data
Patient seen and examined with house-staff during bedside rounds.  Resident note read, including vitals, physical findings, laboratory data, and radiological reports.   Revisions included below.  Direct personal management at bed side and extensive interpretation of the data.  Plan was outlined and discussed in details with the housestaff.  Decision making of high complexity  Action taken for acute disease activity to reflect the level of care provided:  - medication reconciliation  - review laboratory data  await path
Patient seen and examined with house-staff during bedside rounds.  Resident note read, including vitals, physical findings, laboratory data, and radiological reports.   Revisions included below.  Direct personal management at bed side and extensive interpretation of the data.  Plan was outlined and discussed in details with the housestaff.  Decision making of high complexity  Action taken for acute disease activity to reflect the level of care provided:  - medication reconciliation  - review laboratory data  await path

## 2019-05-03 NOTE — PROGRESS NOTE ADULT - SUBJECTIVE AND OBJECTIVE BOX
Interval Events: Reviewed  Patient seen and examined at bedside.    Patient is a 79y old  Female who presents with a chief complaint of pancreatic tail mass (03 May 2019 07:18)  she is walking with no sob.  She is taking liquid with no problem    PAST MEDICAL & SURGICAL HISTORY:  History of kidney stones  Asthma  History of lithotripsy      MEDICATIONS:  Pulmonary:  ALBUTerol    90 MICROgram(s) HFA Inhaler 2 Puff(s) Inhalation every 6 hours PRN    Antimicrobials:    Anticoagulants:  heparin  Injectable 5000 Unit(s) SubCutaneous every 8 hours    Cardiac:      Allergies    No Known Allergies    Intolerances        Vital Signs Last 24 Hrs  T(C): 37.2 (03 May 2019 21:26), Max: 37.2 (02 May 2019 21:56)  T(F): 99 (03 May 2019 21:26), Max: 99 (02 May 2019 21:56)  HR: 92 (03 May 2019 20:22) (80 - 92)  BP: 151/69 (03 May 2019 20:22) (137/61 - 151/69)  BP(mean): 99 (03 May 2019 20:22) (88 - 101)  RR: 16 (03 May 2019 20:22) (16 - 16)  SpO2: 95% (03 May 2019 20:22) (94% - 97%)    05-02 @ 07:01 - 05-03 @ 07:00  --------------------------------------------------------  IN: 1020 mL / OUT: 1865 mL / NET: -845 mL    05-03 @ 07:01 - 05-03 @ 21:54  --------------------------------------------------------  IN: 810 mL / OUT: 1070 mL / NET: -260 mL          LABS:      CBC Full  -  ( 03 May 2019 06:31 )  WBC Count : 18.28 K/uL  RBC Count : 4.11 M/uL  Hemoglobin : 12.0 g/dL  Hematocrit : 38.2 %  Platelet Count - Automated : 236 K/uL  Mean Cell Volume : 92.9 fl  Mean Cell Hemoglobin : 29.2 pg  Mean Cell Hemoglobin Concentration : 31.4 gm/dL  Auto Neutrophil # : x  Auto Lymphocyte # : x  Auto Monocyte # : x  Auto Eosinophil # : x  Auto Basophil # : x  Auto Neutrophil % : x  Auto Lymphocyte % : x  Auto Monocyte % : x  Auto Eosinophil % : x  Auto Basophil % : x    05-03    140  |  102  |  9   ----------------------------<  126<H>  3.6   |  27  |  0.62    Ca    8.6      03 May 2019 06:31  Phos  2.0     05-03  Mg     1.8     05-03    TPro  6.4  /  Alb  3.1<L>  /  TBili  0.2  /  DBili  x   /  AST  44<H>  /  ALT  56<H>  /  AlkPhos  75  05-02                        RADIOLOGY & ADDITIONAL STUDIES (The following images were personally reviewed):  Williamson:                                     No  Urine output:                       adequate  DVT prophylaxis:                 Yes  Flattus:                                  Yes  Bowel movement:              No

## 2019-05-03 NOTE — PROGRESS NOTE ADULT - PROBLEM SELECTOR PLAN 1
continue albuterol as needed..  the base line oxygen saturation is normal.  Increase activity

## 2019-05-03 NOTE — PROVIDER CONTACT NOTE (OTHER) - ACTION/TREATMENT ORDERED:
Lillian stated that team will come around during rounds to know what to do next.
Lillian ordered RN to sofiya roach pt.

## 2019-05-04 LAB
ALBUMIN SERPL ELPH-MCNC: 3.2 G/DL — LOW (ref 3.3–5)
ALP SERPL-CCNC: 110 U/L — SIGNIFICANT CHANGE UP (ref 40–120)
ALT FLD-CCNC: 50 U/L — HIGH (ref 10–45)
AMYLASE FLD-CCNC: 37 U/L — SIGNIFICANT CHANGE UP
ANION GAP SERPL CALC-SCNC: 8 MMOL/L — SIGNIFICANT CHANGE UP (ref 5–17)
AST SERPL-CCNC: 36 U/L — SIGNIFICANT CHANGE UP (ref 10–40)
BILIRUB SERPL-MCNC: 0.5 MG/DL — SIGNIFICANT CHANGE UP (ref 0.2–1.2)
BUN SERPL-MCNC: 8 MG/DL — SIGNIFICANT CHANGE UP (ref 7–23)
CALCIUM SERPL-MCNC: 9 MG/DL — SIGNIFICANT CHANGE UP (ref 8.4–10.5)
CHLORIDE SERPL-SCNC: 101 MMOL/L — SIGNIFICANT CHANGE UP (ref 96–108)
CO2 SERPL-SCNC: 30 MMOL/L — SIGNIFICANT CHANGE UP (ref 22–31)
CREAT SERPL-MCNC: 0.56 MG/DL — SIGNIFICANT CHANGE UP (ref 0.5–1.3)
GLUCOSE SERPL-MCNC: 116 MG/DL — HIGH (ref 70–99)
HCT VFR BLD CALC: 38 % — SIGNIFICANT CHANGE UP (ref 34.5–45)
HGB BLD-MCNC: 12.1 G/DL — SIGNIFICANT CHANGE UP (ref 11.5–15.5)
MAGNESIUM SERPL-MCNC: 2.1 MG/DL — SIGNIFICANT CHANGE UP (ref 1.6–2.6)
MCHC RBC-ENTMCNC: 29.4 PG — SIGNIFICANT CHANGE UP (ref 27–34)
MCHC RBC-ENTMCNC: 31.8 GM/DL — LOW (ref 32–36)
MCV RBC AUTO: 92.5 FL — SIGNIFICANT CHANGE UP (ref 80–100)
NRBC # BLD: 0 /100 WBCS — SIGNIFICANT CHANGE UP (ref 0–0)
PHOSPHATE SERPL-MCNC: 2.3 MG/DL — LOW (ref 2.5–4.5)
PLATELET # BLD AUTO: 228 K/UL — SIGNIFICANT CHANGE UP (ref 150–400)
POTASSIUM SERPL-MCNC: 3.8 MMOL/L — SIGNIFICANT CHANGE UP (ref 3.5–5.3)
POTASSIUM SERPL-SCNC: 3.8 MMOL/L — SIGNIFICANT CHANGE UP (ref 3.5–5.3)
PROT SERPL-MCNC: 6.4 G/DL — SIGNIFICANT CHANGE UP (ref 6–8.3)
RBC # BLD: 4.11 M/UL — SIGNIFICANT CHANGE UP (ref 3.8–5.2)
RBC # FLD: 12.7 % — SIGNIFICANT CHANGE UP (ref 10.3–14.5)
SODIUM SERPL-SCNC: 139 MMOL/L — SIGNIFICANT CHANGE UP (ref 135–145)
SPECIMEN SOURCE FLD: SIGNIFICANT CHANGE UP
WBC # BLD: 19.67 K/UL — HIGH (ref 3.8–10.5)
WBC # FLD AUTO: 19.67 K/UL — HIGH (ref 3.8–10.5)

## 2019-05-04 RX ORDER — POTASSIUM PHOSPHATE, MONOBASIC POTASSIUM PHOSPHATE, DIBASIC 236; 224 MG/ML; MG/ML
15 INJECTION, SOLUTION INTRAVENOUS ONCE
Qty: 0 | Refills: 0 | Status: COMPLETED | OUTPATIENT
Start: 2019-05-04 | End: 2019-05-04

## 2019-05-04 RX ADMIN — OXYCODONE AND ACETAMINOPHEN 1 TABLET(S): 5; 325 TABLET ORAL at 10:24

## 2019-05-04 RX ADMIN — HEPARIN SODIUM 5000 UNIT(S): 5000 INJECTION INTRAVENOUS; SUBCUTANEOUS at 13:59

## 2019-05-04 RX ADMIN — OXYCODONE AND ACETAMINOPHEN 1 TABLET(S): 5; 325 TABLET ORAL at 11:34

## 2019-05-04 RX ADMIN — HEPARIN SODIUM 5000 UNIT(S): 5000 INJECTION INTRAVENOUS; SUBCUTANEOUS at 21:38

## 2019-05-04 RX ADMIN — OXYCODONE AND ACETAMINOPHEN 1 TABLET(S): 5; 325 TABLET ORAL at 21:38

## 2019-05-04 RX ADMIN — OXYCODONE AND ACETAMINOPHEN 1 TABLET(S): 5; 325 TABLET ORAL at 22:00

## 2019-05-04 RX ADMIN — POTASSIUM PHOSPHATE, MONOBASIC POTASSIUM PHOSPHATE, DIBASIC 63.75 MILLIMOLE(S): 236; 224 INJECTION, SOLUTION INTRAVENOUS at 08:57

## 2019-05-04 RX ADMIN — HEPARIN SODIUM 5000 UNIT(S): 5000 INJECTION INTRAVENOUS; SUBCUTANEOUS at 06:27

## 2019-05-04 NOTE — CONSULT NOTE ADULT - ASSESSMENT
79F s/p robotic distal pancreatectomy and splenectomy. Difficult kaplan placed by urology s/p urinary retention postoperatively.    -Limit narcotics  -Bowel regimen if possible  -OOB  -Can have TOV in AM  -If fails, recommend leg bag and can followup in urology clinic following Friday in Urology Clinic @ [facility]  -d/w chief and attending

## 2019-05-04 NOTE — PROGRESS NOTE ADULT - ASSESSMENT
79F PMH Asthma/prior smoker, nephrolithiasis, known IPMN with malignant degeneration s/p elective Robotic assisted lap distal pancreatectomy and splenectomy (5/1)    Pain/Nausea control prn  FLD/IVF, possible advance to soft diet  JPX1 18F in LUQ near panc stump  HSQ/SCDs for DVT ppx  OOBA/IS  Williamson  AM labs  send MITZI amylase  PT: home w/o needs

## 2019-05-04 NOTE — PROGRESS NOTE ADULT - SUBJECTIVE AND OBJECTIVE BOX
24 hr events:  O/N: feeling well, paz PO, ambulating  5/3: Williamson replaced per urology w/ 325cc output, Dr. Magaña-continue care, tolerating FLD, ambulating, maintenance fluids    SUBJECTIVE:  No acute complaints. Tolerating PO (had water). -N/V. +F/-BM. Denies CP/SOB.    Vital Signs Last 24 Hrs  T(C): 37 (04 May 2019 05:06), Max: 37.2 (03 May 2019 09:48)  T(F): 98.6 (04 May 2019 05:06), Max: 99 (03 May 2019 21:26)  HR: 98 (04 May 2019 08:30) (76 - 98)  BP: 128/59 (04 May 2019 08:30) (128/59 - 151/69)  BP(mean): 85 (04 May 2019 08:30) (85 - 99)  RR: 16 (04 May 2019 08:30) (16 - 17)  SpO2: 96% (04 May 2019 08:30) (94% - 98%)    Physical Exam:  General: NAD  Pulmonary: Nonlabored breathing, no respiratory distress  Abdominal: soft, appropriately tender, non-distended; incisions c/d/i; MITZI in place with serosanguinous output  Neuro: A/O x3    I&O's Summary  03 May 2019 07:01  -  04 May 2019 07:00  --------------------------------------------------------  IN: 810 mL / OUT: 1845 mL / NET: -1035 mL    LABS:                    12.1   19.67 )-----------( 228      ( 04 May 2019 05:48 )             38.0     05-04  139  |  101  |  8   ----------------------------<  116<H>  3.8   |  30  |  0.56    Ca    9.0      04 May 2019 05:48  Phos  2.3     05-04  Mg     2.1     05-04    TPro  6.4  /  Alb  3.2<L>  /  TBili  0.5  /  DBili  x   /  AST  36  /  ALT  50<H>  /  AlkPhos  110  05-04    LIVER FUNCTIONS - ( 04 May 2019 05:48 )  Alb: 3.2 g/dL / Pro: 6.4 g/dL / ALK PHOS: 110 U/L / ALT: 50 U/L / AST: 36 U/L / GGT: x

## 2019-05-04 NOTE — CONSULT NOTE ADULT - SUBJECTIVE AND OBJECTIVE BOX
79F s/p robotic distal pancreatectomy and splenectomy had kaplan removed and passed TOV. Patient went into urinary retention thereafter and urology was consulted for kaplan placement. Patient voiding without issue preoperatively. Patient without ROBF postoperatively.    Vital Signs Last 24 Hrs  T(C): 36.8 (04 May 2019 14:34), Max: 37.2 (03 May 2019 21:26)  T(F): 98.2 (04 May 2019 14:34), Max: 99 (03 May 2019 21:26)  HR: 90 (04 May 2019 16:10) (76 - 98)  BP: 135/66 (04 May 2019 16:10) (128/59 - 153/74)  BP(mean): 92 (04 May 2019 16:10) (85 - 106)  RR: 16 (04 May 2019 16:10) (16 - 17)  SpO2: 97% (04 May 2019 16:10) (95% - 98%)    Gen: NAD, AOx3  Abd: sNTND  : kaplan draining clear yellow urine                          12.1   19.67 )-----------( 228      ( 04 May 2019 05:48 )             38.0   04 May 2019 05:48    139    |  101    |  8      ----------------------------<  116    3.8     |  30     |  0.56     Ca    9.0        04 May 2019 05:48  Phos  2.3       04 May 2019 05:48  Mg     2.1       04 May 2019 05:48    TPro  6.4    /  Alb  3.2    /  TBili  0.5    /  DBili  x      /  AST  36     /  ALT  50     /  AlkPhos  110    04 May 2019 05:48

## 2019-05-05 LAB
ALBUMIN SERPL ELPH-MCNC: 2.5 G/DL — LOW (ref 3.3–5)
ALP SERPL-CCNC: 111 U/L — SIGNIFICANT CHANGE UP (ref 40–120)
ALT FLD-CCNC: 73 U/L — HIGH (ref 10–45)
ANION GAP SERPL CALC-SCNC: 9 MMOL/L — SIGNIFICANT CHANGE UP (ref 5–17)
AST SERPL-CCNC: 51 U/L — HIGH (ref 10–40)
BILIRUB SERPL-MCNC: 0.3 MG/DL — SIGNIFICANT CHANGE UP (ref 0.2–1.2)
BUN SERPL-MCNC: 13 MG/DL — SIGNIFICANT CHANGE UP (ref 7–23)
CALCIUM SERPL-MCNC: 8.9 MG/DL — SIGNIFICANT CHANGE UP (ref 8.4–10.5)
CHLORIDE SERPL-SCNC: 103 MMOL/L — SIGNIFICANT CHANGE UP (ref 96–108)
CO2 SERPL-SCNC: 28 MMOL/L — SIGNIFICANT CHANGE UP (ref 22–31)
CREAT SERPL-MCNC: 0.54 MG/DL — SIGNIFICANT CHANGE UP (ref 0.5–1.3)
GLUCOSE SERPL-MCNC: 106 MG/DL — HIGH (ref 70–99)
HCT VFR BLD CALC: 38.5 % — SIGNIFICANT CHANGE UP (ref 34.5–45)
HGB BLD-MCNC: 12 G/DL — SIGNIFICANT CHANGE UP (ref 11.5–15.5)
MAGNESIUM SERPL-MCNC: 2.2 MG/DL — SIGNIFICANT CHANGE UP (ref 1.6–2.6)
MCHC RBC-ENTMCNC: 29 PG — SIGNIFICANT CHANGE UP (ref 27–34)
MCHC RBC-ENTMCNC: 31.2 GM/DL — LOW (ref 32–36)
MCV RBC AUTO: 93 FL — SIGNIFICANT CHANGE UP (ref 80–100)
NRBC # BLD: 0 /100 WBCS — SIGNIFICANT CHANGE UP (ref 0–0)
PHOSPHATE SERPL-MCNC: 2.8 MG/DL — SIGNIFICANT CHANGE UP (ref 2.5–4.5)
PLATELET # BLD AUTO: 231 K/UL — SIGNIFICANT CHANGE UP (ref 150–400)
POTASSIUM SERPL-MCNC: 3.8 MMOL/L — SIGNIFICANT CHANGE UP (ref 3.5–5.3)
POTASSIUM SERPL-SCNC: 3.8 MMOL/L — SIGNIFICANT CHANGE UP (ref 3.5–5.3)
PROT SERPL-MCNC: 6.4 G/DL — SIGNIFICANT CHANGE UP (ref 6–8.3)
RBC # BLD: 4.14 M/UL — SIGNIFICANT CHANGE UP (ref 3.8–5.2)
RBC # FLD: 13 % — SIGNIFICANT CHANGE UP (ref 10.3–14.5)
SODIUM SERPL-SCNC: 140 MMOL/L — SIGNIFICANT CHANGE UP (ref 135–145)
WBC # BLD: 14.54 K/UL — HIGH (ref 3.8–10.5)
WBC # FLD AUTO: 14.54 K/UL — HIGH (ref 3.8–10.5)

## 2019-05-05 RX ADMIN — HEPARIN SODIUM 5000 UNIT(S): 5000 INJECTION INTRAVENOUS; SUBCUTANEOUS at 21:17

## 2019-05-05 RX ADMIN — HEPARIN SODIUM 5000 UNIT(S): 5000 INJECTION INTRAVENOUS; SUBCUTANEOUS at 05:20

## 2019-05-05 RX ADMIN — HEPARIN SODIUM 5000 UNIT(S): 5000 INJECTION INTRAVENOUS; SUBCUTANEOUS at 13:42

## 2019-05-05 NOTE — PROGRESS NOTE ADULT - ASSESSMENT
79F PMH Asthma/prior smoker, nephrolithiasis, known IPMN with malignant degeneration s/p elective Robotic assisted lap distal pancreatectomy and splenectomy (5/1)    Pain/Nausea control prn  Low residue dietIVF  JPX1 19F in LUQ near panc stump  HSQ/SCDs for DVT ppx  OOBA/IS  Williamson  AM labs  PT: home w/o needs

## 2019-05-05 NOTE — PROGRESS NOTE ADULT - SUBJECTIVE AND OBJECTIVE BOX
OVERNIGHT EVENTS: No acute events overnight   5/4: Advanced to soft diet; IMTZI amylase 37; Urology recs: TOV tomorrow, if fails, kaplan + dc with leg bag     STATUS POST:   Robotic assisted lap distal pancreatectomy and splenectomy     POST OPERATIVE DAY #: 4    SUBJECTIVE: Patient examined bedside with chief resident. Patient reports incisional pain improved and tolerating diet. Patient reports she is passing flatus and denies bowel movements. Patient denies nausea , vomiting, chest pain and SOB. Patient making adequate urine output.      heparin  Injectable 5000 Unit(s) SubCutaneous every 8 hours      Vital Signs Last 24 Hrs  T(C): 36.9 (05 May 2019 09:02), Max: 37.2 (04 May 2019 17:37)  T(F): 98.4 (05 May 2019 09:02), Max: 99 (04 May 2019 17:37)  HR: 96 (05 May 2019 12:05) (70 - 96)  BP: 139/65 (05 May 2019 12:05) (126/57 - 139/67)  BP(mean): 94 (05 May 2019 12:05) (81 - 96)  RR: 18 (05 May 2019 12:05) (16 - 18)  SpO2: 95% (05 May 2019 12:05) (92% - 98%)  I&O's Detail    04 May 2019 07:01  -  05 May 2019 07:00  --------------------------------------------------------  IN:    Solution: 250 mL  Total IN: 250 mL    OUT:    Bulb: 18 mL    Indwelling Catheter - Urethral: 735 mL  Total OUT: 753 mL    Total NET: -503 mL      05 May 2019 07:01  -  05 May 2019 12:36  --------------------------------------------------------  IN:  Total IN: 0 mL    OUT:    Indwelling Catheter - Urethral: 150 mL  Total OUT: 150 mL    Total NET: -150 mL          General: NAD, resting comfortably in bed  C/V: NSR  Pulm: Nonlabored breathing, no respiratory distress  Abd: Soft, non-distended, TTP around incision site, incision clean dry and intact.  Extrem: WWP, no edema, SCDs in place        LABS:                        12.0   14.54 )-----------( 231      ( 05 May 2019 05:47 )             38.5     05-05    140  |  103  |  13  ----------------------------<  106<H>  3.8   |  28  |  0.54    Ca    8.9      05 May 2019 05:47  Phos  2.8     05-05  Mg     2.2     05-05    TPro  6.4  /  Alb  2.5<L>  /  TBili  0.3  /  DBili  x   /  AST  51<H>  /  ALT  73<H>  /  AlkPhos  111  05-05          RADIOLOGY & ADDITIONAL STUDIES:

## 2019-05-06 ENCOUNTER — TRANSCRIPTION ENCOUNTER (OUTPATIENT)
Age: 79
End: 2019-05-06

## 2019-05-06 VITALS
RESPIRATION RATE: 18 BRPM | SYSTOLIC BLOOD PRESSURE: 152 MMHG | OXYGEN SATURATION: 100 % | HEART RATE: 78 BPM | DIASTOLIC BLOOD PRESSURE: 76 MMHG

## 2019-05-06 LAB
ALBUMIN SERPL ELPH-MCNC: 2.8 G/DL — LOW (ref 3.3–5)
ALP SERPL-CCNC: 117 U/L — SIGNIFICANT CHANGE UP (ref 40–120)
ALT FLD-CCNC: 74 U/L — HIGH (ref 10–45)
ANION GAP SERPL CALC-SCNC: 10 MMOL/L — SIGNIFICANT CHANGE UP (ref 5–17)
AST SERPL-CCNC: 40 U/L — SIGNIFICANT CHANGE UP (ref 10–40)
BILIRUB SERPL-MCNC: 0.4 MG/DL — SIGNIFICANT CHANGE UP (ref 0.2–1.2)
BUN SERPL-MCNC: 11 MG/DL — SIGNIFICANT CHANGE UP (ref 7–23)
CALCIUM SERPL-MCNC: 9 MG/DL — SIGNIFICANT CHANGE UP (ref 8.4–10.5)
CHLORIDE SERPL-SCNC: 100 MMOL/L — SIGNIFICANT CHANGE UP (ref 96–108)
CO2 SERPL-SCNC: 28 MMOL/L — SIGNIFICANT CHANGE UP (ref 22–31)
CREAT SERPL-MCNC: 0.59 MG/DL — SIGNIFICANT CHANGE UP (ref 0.5–1.3)
GLUCOSE SERPL-MCNC: 109 MG/DL — HIGH (ref 70–99)
HCT VFR BLD CALC: 36.3 % — SIGNIFICANT CHANGE UP (ref 34.5–45)
HGB BLD-MCNC: 11.8 G/DL — SIGNIFICANT CHANGE UP (ref 11.5–15.5)
MAGNESIUM SERPL-MCNC: 2.1 MG/DL — SIGNIFICANT CHANGE UP (ref 1.6–2.6)
MCHC RBC-ENTMCNC: 29.6 PG — SIGNIFICANT CHANGE UP (ref 27–34)
MCHC RBC-ENTMCNC: 32.5 GM/DL — SIGNIFICANT CHANGE UP (ref 32–36)
MCV RBC AUTO: 91 FL — SIGNIFICANT CHANGE UP (ref 80–100)
NRBC # BLD: 0 /100 WBCS — SIGNIFICANT CHANGE UP (ref 0–0)
PHOSPHATE SERPL-MCNC: 3.3 MG/DL — SIGNIFICANT CHANGE UP (ref 2.5–4.5)
PLATELET # BLD AUTO: 261 K/UL — SIGNIFICANT CHANGE UP (ref 150–400)
POTASSIUM SERPL-MCNC: 3.7 MMOL/L — SIGNIFICANT CHANGE UP (ref 3.5–5.3)
POTASSIUM SERPL-SCNC: 3.7 MMOL/L — SIGNIFICANT CHANGE UP (ref 3.5–5.3)
PROT SERPL-MCNC: 6.3 G/DL — SIGNIFICANT CHANGE UP (ref 6–8.3)
RBC # BLD: 3.99 M/UL — SIGNIFICANT CHANGE UP (ref 3.8–5.2)
RBC # FLD: 12.7 % — SIGNIFICANT CHANGE UP (ref 10.3–14.5)
SODIUM SERPL-SCNC: 138 MMOL/L — SIGNIFICANT CHANGE UP (ref 135–145)
WBC # BLD: 11.33 K/UL — HIGH (ref 3.8–10.5)
WBC # FLD AUTO: 11.33 K/UL — HIGH (ref 3.8–10.5)

## 2019-05-06 PROCEDURE — 86901 BLOOD TYPING SEROLOGIC RH(D): CPT

## 2019-05-06 PROCEDURE — 85730 THROMBOPLASTIN TIME PARTIAL: CPT

## 2019-05-06 PROCEDURE — 88309 TISSUE EXAM BY PATHOLOGIST: CPT

## 2019-05-06 PROCEDURE — 85610 PROTHROMBIN TIME: CPT

## 2019-05-06 PROCEDURE — 36415 COLL VENOUS BLD VENIPUNCTURE: CPT

## 2019-05-06 PROCEDURE — 85027 COMPLETE CBC AUTOMATED: CPT

## 2019-05-06 PROCEDURE — 83615 LACTATE (LD) (LDH) ENZYME: CPT

## 2019-05-06 PROCEDURE — C1889: CPT

## 2019-05-06 PROCEDURE — 80053 COMPREHEN METABOLIC PANEL: CPT

## 2019-05-06 PROCEDURE — 97161 PT EVAL LOW COMPLEX 20 MIN: CPT

## 2019-05-06 PROCEDURE — 86850 RBC ANTIBODY SCREEN: CPT

## 2019-05-06 PROCEDURE — 97116 GAIT TRAINING THERAPY: CPT

## 2019-05-06 PROCEDURE — 86900 BLOOD TYPING SEROLOGIC ABO: CPT

## 2019-05-06 PROCEDURE — 84100 ASSAY OF PHOSPHORUS: CPT

## 2019-05-06 PROCEDURE — 82150 ASSAY OF AMYLASE: CPT

## 2019-05-06 PROCEDURE — 83735 ASSAY OF MAGNESIUM: CPT

## 2019-05-06 PROCEDURE — 80048 BASIC METABOLIC PNL TOTAL CA: CPT

## 2019-05-06 PROCEDURE — 83605 ASSAY OF LACTIC ACID: CPT

## 2019-05-06 PROCEDURE — 85025 COMPLETE CBC W/AUTO DIFF WBC: CPT

## 2019-05-06 PROCEDURE — 88305 TISSUE EXAM BY PATHOLOGIST: CPT

## 2019-05-06 PROCEDURE — S2900: CPT

## 2019-05-06 PROCEDURE — 86923 COMPATIBILITY TEST ELECTRIC: CPT

## 2019-05-06 RX ORDER — SENNA PLUS 8.6 MG/1
1 TABLET ORAL ONCE
Qty: 0 | Refills: 0 | Status: COMPLETED | OUTPATIENT
Start: 2019-05-06 | End: 2019-05-06

## 2019-05-06 RX ORDER — DOCUSATE SODIUM 100 MG
100 CAPSULE ORAL THREE TIMES A DAY
Qty: 0 | Refills: 0 | Status: DISCONTINUED | OUTPATIENT
Start: 2019-05-06 | End: 2019-05-06

## 2019-05-06 RX ORDER — POTASSIUM CHLORIDE 20 MEQ
40 PACKET (EA) ORAL ONCE
Qty: 0 | Refills: 0 | Status: COMPLETED | OUTPATIENT
Start: 2019-05-06 | End: 2019-05-06

## 2019-05-06 RX ORDER — SENNA PLUS 8.6 MG/1
2 TABLET ORAL AT BEDTIME
Qty: 0 | Refills: 0 | Status: DISCONTINUED | OUTPATIENT
Start: 2019-05-06 | End: 2019-05-06

## 2019-05-06 RX ADMIN — HEPARIN SODIUM 5000 UNIT(S): 5000 INJECTION INTRAVENOUS; SUBCUTANEOUS at 05:22

## 2019-05-06 RX ADMIN — Medication 40 MILLIEQUIVALENT(S): at 09:45

## 2019-05-06 RX ADMIN — Medication 10 MILLIGRAM(S): at 09:45

## 2019-05-06 RX ADMIN — SENNA PLUS 1 TABLET(S): 8.6 TABLET ORAL at 09:45

## 2019-05-06 RX ADMIN — HEPARIN SODIUM 5000 UNIT(S): 5000 INJECTION INTRAVENOUS; SUBCUTANEOUS at 14:18

## 2019-05-06 NOTE — PROGRESS NOTE ADULT - ASSESSMENT
A/P: 79F PMH Asthma/prior smoker, nephrolithiasis, known IPMN with malignant degeneration s/p elective Robotic assisted lap distal pancreatectomy and splenectomy (5/1). Will monitor for bowel movement and consider discharge soon.    Pain/Nausea control prn  Soft diet  JPX1 18F in LUQ near panc stump  HSQ/SCDs for DVT ppx  OOBA/IS  AM labs  PT: home w/o needs

## 2019-05-06 NOTE — DISCHARGE NOTE PROVIDER - HOSPITAL COURSE
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 The patient was admitted to [facility] on 5/1/2019 and underwent an elective robotic assisted lap distal pancreatectomy and splenectomy. On 5/2, the patient was seen by physical therapy who deemed the patient home with no needs.  On 5/3, the patient was found to have overflow incontinence, which required placement of a Williamson catheter.  Dr. Magaña evaluated the patient and recommended continuing the current care.  The patient tolerated a full liquid diet.  On 5/4, the patient tolerated a soft diet and amylase from MITZI fluid was within normal limits.  On 5/5, the patient voided without a Williamson catheter.  On 5/6, her MITZI drain was removed.  At the time of discharge, the patient's pain was controlled by oral medications, tolerated a soft diet, able to void, and had a soft/nontender abdomen with clean, dry, and intact incisions.

## 2019-05-06 NOTE — PROGRESS NOTE ADULT - SUBJECTIVE AND OBJECTIVE BOX
ID: 79F PMH Asthma/prior smoker, nephrolithiasis, known IPMN with malignant degeneration s/p elective Robotic assisted lap distal pancreatectomy and splenectomy (5/1)      SUBJECTIVE: Denies recent bowel movement; pain controlled      OBJECTIVE:    Vital Signs:  Vital Signs Last 24 Hrs  T(C): 36.7 (06 May 2019 05:26), Max: 37.2 (05 May 2019 21:26)  T(F): 98.1 (06 May 2019 05:26), Max: 99 (05 May 2019 21:26)  HR: 80 (06 May 2019 04:35) (72 - 96)  BP: 160/77 (06 May 2019 04:35) (126/57 - 160/77)  BP(mean): 110 (06 May 2019 04:35) (81 - 110)  RR: 16 (06 May 2019 04:35) (16 - 18)  SpO2: 99% (06 May 2019 04:35) (92% - 99%)    Physical Exam:  General: NAD  Pulmonary: Nonlabored breathing, no respiratory distress  Cardiovascular: No heaves/thrills  Abdominal: Soft, nontender, slightly distended, incisions CDI, MITZI serosanguinous drainage  Extremities: WWP, no clubbing/cyanosis/edema  Neuro: AAO x3, no focal deficits    Lines/Drains/Tubes:    Ins and Outs:  I&O's Summary    05 May 2019 07:01  -  06 May 2019 07:00  --------------------------------------------------------  IN: 0 mL / OUT: 933 mL / NET: -933 mL        Labs:                        12.0   14.54 )-----------( 231      ( 05 May 2019 05:47 )             38.5     05-05    140  |  103  |  13  ----------------------------<  106<H>  3.8   |  28  |  0.54    Ca    8.9      05 May 2019 05:47  Phos  2.8     05-05  Mg     2.2     05-05    TPro  6.4  /  Alb  2.5<L>  /  TBili  0.3  /  DBili  x   /  AST  51<H>  /  ALT  73<H>  /  AlkPhos  111  05-05        CAPILLARY BLOOD GLUCOSE        LIVER FUNCTIONS - ( 05 May 2019 05:47 )  Alb: 2.5 g/dL / Pro: 6.4 g/dL / ALK PHOS: 111 U/L / ALT: 73 U/L / AST: 51 U/L / GGT: x             Radiology & Additional Studies:

## 2019-05-06 NOTE — DISCHARGE NOTE PROVIDER - CARE PROVIDER_API CALL
Tami Walsh)  Surgery; Surgical Oncology  [address], [address] [place]  [place] [address]  Phone: [phone]  Fax: [phone]  Follow Up Time:

## 2019-05-06 NOTE — DISCHARGE NOTE NURSING/CASE MANAGEMENT/SOCIAL WORK - NSDCDPATPORTLINK_GEN_ALL_CORE
You can access the [facility], offered by [facility], by registering with the following website: [email or web address]

## 2019-05-06 NOTE — DISCHARGE NOTE PROVIDER - NSDCCPCAREPLAN_GEN_ALL_CORE_FT
PRINCIPAL DISCHARGE DIAGNOSIS  Diagnosis: IPMN (intraductal papillary mucinous neoplasm)  Assessment and Plan of Treatment: Please follow up with Dr. Walsh in 1 week.  Call the office to schedule your appointment.  You may shower using soap and water over incision sites.  Do not scrub incision sites and pat dry when done.  No tub bathing or swimming until confirmation during your follow up appointment.  Keep incision sites out of the sun, as scars will darken.  Ambulate as tolerated, but no heavy lifting (>10lbs) or strenuous exercise.  You may continue a soft diet.  You should be urinating at least 3-4x per day.  Call the office if you experience increasing pain, abdominal pain, nausea, vomiting, or temperature >101.4F.  1) Please take Acetaminophen 1,000mg by mouth every 8 hours (maximum of 3,000mg over 24 hours)

## 2019-05-06 NOTE — PROGRESS NOTE ADULT - REASON FOR ADMISSION
pancreatic tail mass

## 2019-05-13 DIAGNOSIS — R33.9 RETENTION OF URINE, UNSPECIFIED: ICD-10-CM

## 2019-05-13 DIAGNOSIS — R91.1 SOLITARY PULMONARY NODULE: ICD-10-CM

## 2019-05-13 DIAGNOSIS — Z79.82 LONG TERM (CURRENT) USE OF ASPIRIN: ICD-10-CM

## 2019-05-13 DIAGNOSIS — C25.1 MALIGNANT NEOPLASM OF BODY OF PANCREAS: ICD-10-CM

## 2019-05-13 DIAGNOSIS — E83.39 OTHER DISORDERS OF PHOSPHORUS METABOLISM: ICD-10-CM

## 2019-05-13 DIAGNOSIS — Z87.442 PERSONAL HISTORY OF URINARY CALCULI: ICD-10-CM

## 2019-05-13 DIAGNOSIS — N39.490 OVERFLOW INCONTINENCE: ICD-10-CM

## 2019-05-13 DIAGNOSIS — C25.2 MALIGNANT NEOPLASM OF TAIL OF PANCREAS: ICD-10-CM

## 2019-05-13 DIAGNOSIS — J45.30 MILD PERSISTENT ASTHMA, UNCOMPLICATED: ICD-10-CM

## 2019-05-13 DIAGNOSIS — Z87.891 PERSONAL HISTORY OF NICOTINE DEPENDENCE: ICD-10-CM

## 2019-05-20 PROBLEM — R93.89 ABNORMAL CHEST X-RAY: Status: ACTIVE | Noted: 2018-10-02

## 2019-05-20 NOTE — ADDENDUM
[FreeTextEntry1] : Documented by Onel May acting as a scribe for Dr. Matthew Askew on 03/08/2019\par

## 2019-05-20 NOTE — HISTORY OF PRESENT ILLNESS
[de-identified] : Pt is a 77 y/o F with hx of multiple pancreactic cysts, pulmonary nodules, and cough who presents today feeling well. Pt had an MRI in january, to f/u with MRI with  in 2016. The largest cyst has increased in size, complexity, and evidence of enhancing mural nodule. Due to the change in character of this cyst, it is reccomended that the pt undergo distal pancreatectomy with possible splenectomy. \par \par Patient received a second opinion from Dr. Tami Walsh and would like to proceed with surgery with her. \par

## 2019-05-31 PROBLEM — Z87.442 PERSONAL HISTORY OF URINARY CALCULI: Chronic | Status: ACTIVE | Noted: 2019-05-01

## 2019-05-31 PROBLEM — J45.909 UNSPECIFIED ASTHMA, UNCOMPLICATED: Chronic | Status: ACTIVE | Noted: 2019-04-30

## 2019-06-06 ENCOUNTER — APPOINTMENT (OUTPATIENT)
Dept: INTERNAL MEDICINE | Facility: CLINIC | Age: 79
End: 2019-06-06

## 2019-06-17 ENCOUNTER — APPOINTMENT (OUTPATIENT)
Dept: INTERVENTIONAL RADIOLOGY/VASCULAR | Facility: HOSPITAL | Age: 79
End: 2019-06-17

## 2019-06-17 ENCOUNTER — APPOINTMENT (OUTPATIENT)
Dept: PULMONOLOGY | Facility: CLINIC | Age: 79
End: 2019-06-17

## 2019-06-17 ENCOUNTER — OUTPATIENT (OUTPATIENT)
Dept: OUTPATIENT SERVICES | Facility: HOSPITAL | Age: 79
LOS: 1 days | End: 2019-06-17
Payer: MEDICARE

## 2019-06-17 DIAGNOSIS — C25.9 MALIGNANT NEOPLASM OF PANCREAS, UNSPECIFIED: ICD-10-CM

## 2019-06-17 DIAGNOSIS — Z98.890 OTHER SPECIFIED POSTPROCEDURAL STATES: Chronic | ICD-10-CM

## 2019-06-17 DIAGNOSIS — Z45.2 ENCOUNTER FOR ADJUSTMENT AND MANAGEMENT OF VASCULAR ACCESS DEVICE: ICD-10-CM

## 2019-06-17 PROCEDURE — 36561 INSERT TUNNELED CV CATH: CPT

## 2019-06-17 PROCEDURE — 76937 US GUIDE VASCULAR ACCESS: CPT

## 2019-06-17 PROCEDURE — C1769: CPT

## 2019-06-17 PROCEDURE — 76937 US GUIDE VASCULAR ACCESS: CPT | Mod: 26

## 2019-06-17 PROCEDURE — C1788: CPT

## 2019-06-17 PROCEDURE — 99152 MOD SED SAME PHYS/QHP 5/>YRS: CPT

## 2019-06-17 PROCEDURE — 99153 MOD SED SAME PHYS/QHP EA: CPT | Mod: 59

## 2019-06-17 PROCEDURE — 77001 FLUOROGUIDE FOR VEIN DEVICE: CPT

## 2019-06-17 PROCEDURE — 77001 FLUOROGUIDE FOR VEIN DEVICE: CPT | Mod: 26

## 2019-06-19 ENCOUNTER — APPOINTMENT (OUTPATIENT)
Dept: INTERNAL MEDICINE | Facility: CLINIC | Age: 79
End: 2019-06-19
Payer: MEDICARE

## 2019-06-19 VITALS
BODY MASS INDEX: 24.07 KG/M2 | OXYGEN SATURATION: 97 % | SYSTOLIC BLOOD PRESSURE: 140 MMHG | TEMPERATURE: 97.7 F | DIASTOLIC BLOOD PRESSURE: 75 MMHG | HEIGHT: 56 IN | HEART RATE: 79 BPM | WEIGHT: 107 LBS

## 2019-06-19 DIAGNOSIS — R09.81 NASAL CONGESTION: ICD-10-CM

## 2019-06-19 DIAGNOSIS — Z82.49 FAMILY HISTORY OF ISCHEMIC HEART DISEASE AND OTHER DISEASES OF THE CIRCULATORY SYSTEM: ICD-10-CM

## 2019-06-19 DIAGNOSIS — Z87.891 PERSONAL HISTORY OF NICOTINE DEPENDENCE: ICD-10-CM

## 2019-06-19 DIAGNOSIS — Z80.3 FAMILY HISTORY OF MALIGNANT NEOPLASM OF BREAST: ICD-10-CM

## 2019-06-19 DIAGNOSIS — Z78.9 OTHER SPECIFIED HEALTH STATUS: ICD-10-CM

## 2019-06-19 DIAGNOSIS — Z80.42 FAMILY HISTORY OF MALIGNANT NEOPLASM OF PROSTATE: ICD-10-CM

## 2019-06-19 PROCEDURE — 99214 OFFICE O/P EST MOD 30 MIN: CPT | Mod: GC

## 2019-06-19 RX ORDER — FLUTICASONE FUROATE AND VILANTEROL TRIFENATATE 100; 25 UG/1; UG/1
100-25 POWDER RESPIRATORY (INHALATION) DAILY
Refills: 0 | Status: DISCONTINUED | COMMUNITY
Start: 2018-11-15 | End: 2019-06-19

## 2019-06-21 ENCOUNTER — APPOINTMENT (OUTPATIENT)
Dept: PULMONOLOGY | Facility: CLINIC | Age: 79
End: 2019-06-21
Payer: MEDICARE

## 2019-06-21 VITALS
WEIGHT: 104 LBS | TEMPERATURE: 98.5 F | OXYGEN SATURATION: 97 % | BODY MASS INDEX: 23.39 KG/M2 | HEART RATE: 89 BPM | HEIGHT: 56 IN | RESPIRATION RATE: 12 BRPM | DIASTOLIC BLOOD PRESSURE: 80 MMHG | SYSTOLIC BLOOD PRESSURE: 110 MMHG

## 2019-06-21 PROCEDURE — 99214 OFFICE O/P EST MOD 30 MIN: CPT

## 2019-06-21 NOTE — PHYSICAL EXAM
[Normal Appearance] : normal appearance [General Appearance - Well Developed] : well developed [General Appearance - Well Nourished] : well nourished [Well Groomed] : well groomed [No Deformities] : no deformities [Normal Conjunctiva] : the conjunctiva exhibited no abnormalities [General Appearance - In No Acute Distress] : no acute distress [Eyelids - No Xanthelasma] : the eyelids demonstrated no xanthelasmas [Normal Oropharynx] : normal oropharynx [Neck Cervical Mass (___cm)] : no neck mass was observed [Jugular Venous Distention Increased] : there was no jugular-venous distention [Neck Appearance] : the appearance of the neck was normal [Thyroid Diffuse Enlargement] : the thyroid was not enlarged [Thyroid Nodule] : there were no palpable thyroid nodules [Murmurs] : no murmurs present [Heart Sounds] : normal S1 and S2 [Heart Rate And Rhythm] : heart rate and rhythm were normal [Respiration, Rhythm And Depth] : normal respiratory rhythm and effort [Abdomen Soft] : soft [Exaggerated Use Of Accessory Muscles For Inspiration] : no accessory muscle use [Auscultation Breath Sounds / Voice Sounds] : lungs were clear to auscultation bilaterally [Abdomen Mass (___ Cm)] : no abdominal mass palpated [Abdomen Tenderness] : non-tender [Abnormal Walk] : normal gait [Gait - Sufficient For Exercise Testing] : the gait was sufficient for exercise testing [Nail Clubbing] : no clubbing of the fingernails [Cyanosis, Localized] : no localized cyanosis [Petechial Hemorrhages (___cm)] : no petechial hemorrhages [] : no rash [Skin Color & Pigmentation] : normal skin color and pigmentation [No Skin Ulcers] : no skin ulcer [No Venous Stasis] : no venous stasis [Skin Lesions] : no skin lesions [Deep Tendon Reflexes (DTR)] : deep tendon reflexes were 2+ and symmetric [No Xanthoma] : no  xanthoma was observed [No Focal Deficits] : no focal deficits [Sensation] : the sensory exam was normal to light touch and pinprick [Oriented To Time, Place, And Person] : oriented to person, place, and time [Affect] : the affect was normal [Impaired Insight] : insight and judgment were intact

## 2019-06-21 NOTE — HISTORY OF PRESENT ILLNESS
[Difficulty Breathing During Exertion] : denies dyspnea on exertion [Feelings Of Weakness On Exertion] : denies exercise intolerance [Cough] : denies coughing [Wheezing] : denies wheezing [Regional Soft Tissue Swelling Both Lower Extremities] : denies lower extremity edema [Chest Pain Or Discomfort] : denies chest pain [Fever] : denies fever [Class II - Mild Symptoms and Slight Limitations] : II [0  -  Nothing at all] : 0, nothing at all [More Frequent Use Needed Recently] : Patient reports recent increase in frequency of [___ Times a Day] : [unfilled] time(s) a day [Adherent] : the patient is adherent with ~his/her~ medication regimen [None] : None [Never] : was never a smoker [Goals--Doing Well] : the patient is doing well with ~his/her~ goals [Wt Gain ___ Lbs] : no recent weight gain [Wt Loss ___ Lbs] : no recent weight loss [Oxygen] : the patient uses no supplemental oxygen [Good Control] : peak flow has been poor [Side Effects] : ~He/She~ denies medication side effects [FreeTextEntry1] : 79 year old female with PMH of Asthma, pancreatic CA s/p surg and starting chemo June 24th (chemo for 6 months) and starting on Xeloda (followed by Dr. Pruitt) and Pulmonary nodules.  She got a right sided chemo port placed on 6/17/2019.  She has home attendant M-W-F for 4-5 hours. \par \par She is doing well. Appetite is good and maintaining her weight.  She is using the Breo.  She was seen by gerontologist last week, who said she was wheezing due to stopping her Breo.  She placed her back on the Breo.  She denies any wheezing, coughing, fever, SOB.  She is walking without SOB and  doing her ADL.   She is not requiring the Ventolin as needed basis. No severe shortness 14 blocks with no problem. She does not wake up in the middle night gasping for air. The leg is not swollen.  Denies hemoptysis.  She denies any N/V, abdominal pain or blood in stool

## 2019-06-21 NOTE — ASSESSMENT
[FreeTextEntry1] : Pulmonary nodules\par \par Pt was originally referred to us for an abnormal CXR from her PCP Dr. Mercado. Follow up CT scan was performed October 9, 2018, with findings of multiple solid subcentimeter nodules largest 5 mm and signs of small and large airways disease with mild air trapping and tree and bud nodules. Incidental finding was 2 cystic structures, measuring 2 cm, in the pancreatic bed. \par \par Discussed pulmonary nodules could be due to scarring, inflammation or malignancy, but not limited too. Reviewed Fleischer society guidelines with pt and high risk due to smoking history. \par \par Plan \par \par - Follow up with repeat CT scan in one year (10/2019)  Pt to follow up with up with repeat CT scan with Dr. Pruitt to forward report.\par \par Asthma\par \par Pt is currently stable and rarely requiring her LEILA. Continue on Breo 200 and LEILA as needed. ACT score 25/25. Her cough has improved since last visit. Juan last visit without significant change from prior PFT.   Peak flow performed personal best out of 3 tries 250.  Discussed action plan and when to call us. \par \par Fu 3 months  \par

## 2019-06-26 ENCOUNTER — APPOINTMENT (OUTPATIENT)
Dept: INTERNAL MEDICINE | Facility: CLINIC | Age: 79
End: 2019-06-26
Payer: MEDICARE

## 2019-06-26 VITALS
HEART RATE: 74 BPM | TEMPERATURE: 97.6 F | SYSTOLIC BLOOD PRESSURE: 123 MMHG | DIASTOLIC BLOOD PRESSURE: 71 MMHG | OXYGEN SATURATION: 96 % | HEIGHT: 56 IN | BODY MASS INDEX: 24.3 KG/M2 | WEIGHT: 108 LBS

## 2019-06-26 PROCEDURE — 99214 OFFICE O/P EST MOD 30 MIN: CPT | Mod: GC

## 2019-07-10 ENCOUNTER — APPOINTMENT (OUTPATIENT)
Dept: INTERNAL MEDICINE | Facility: CLINIC | Age: 79
End: 2019-07-10
Payer: MEDICARE

## 2019-07-10 VITALS
BODY MASS INDEX: 24.3 KG/M2 | HEIGHT: 56 IN | HEART RATE: 83 BPM | SYSTOLIC BLOOD PRESSURE: 111 MMHG | DIASTOLIC BLOOD PRESSURE: 68 MMHG | OXYGEN SATURATION: 97 % | WEIGHT: 108 LBS | TEMPERATURE: 98 F

## 2019-07-10 DIAGNOSIS — Z00.00 ENCOUNTER FOR GENERAL ADULT MEDICAL EXAMINATION W/OUT ABNORMAL FINDINGS: ICD-10-CM

## 2019-07-10 PROCEDURE — 99214 OFFICE O/P EST MOD 30 MIN: CPT | Mod: GC

## 2019-08-02 ENCOUNTER — APPOINTMENT (OUTPATIENT)
Dept: PULMONOLOGY | Facility: CLINIC | Age: 79
End: 2019-08-02
Payer: MEDICARE

## 2019-08-02 VITALS
OXYGEN SATURATION: 97 % | SYSTOLIC BLOOD PRESSURE: 122 MMHG | HEIGHT: 56 IN | TEMPERATURE: 98.6 F | WEIGHT: 106.6 LBS | DIASTOLIC BLOOD PRESSURE: 74 MMHG | HEART RATE: 92 BPM | BODY MASS INDEX: 23.98 KG/M2

## 2019-08-02 PROCEDURE — 99214 OFFICE O/P EST MOD 30 MIN: CPT

## 2019-08-02 NOTE — ASSESSMENT
[FreeTextEntry1] : Pulmonary nodules\par \par Pt was originally referred to us for an abnormal CXR from her PCP Dr. Mercado.  She is now following PCP Dr. Burch. Follow up CT scan was performed October 9, 2018, with findings of multiple solid subcentimeter nodules largest 5 mm and signs of small and large airways disease with mild air trapping and tree and bud nodules. Incidental finding was 2 cystic structures, measuring 2 cm, in the pancreatic bed. \par \par Discussed pulmonary nodules could be due to scarring, inflammation or malignancy, but not limited too. Reviewed Fleischer society guidelines with pt and high risk due to smoking history. \par \par Plan \par \par - Follow up with repeat CT scan in one year (10/2019)  Pt to follow up with up with repeat CT scan with Dr. Pruitt to forward report.\par \par Asthma\par \par Pt is currently stable and rarely requiring her LEILA. Continue on Breo 200 and LEILA as needed. ACT score 23/25. Her cough has improved since last visit. Juan last visit without significant change from prior PFT.   Peak flow performed personal best 250.  Discussed action plan and when to call us.   She is to continue on current inhalers. \par \par FU after CT scan in October. \par

## 2019-08-02 NOTE — HISTORY OF PRESENT ILLNESS
[Difficulty Breathing During Exertion] : denies dyspnea on exertion [Cough] : denies coughing [Feelings Of Weakness On Exertion] : denies exercise intolerance [Wheezing] : denies wheezing [Regional Soft Tissue Swelling Both Lower Extremities] : denies lower extremity edema [Chest Pain Or Discomfort] : denies chest pain [Fever] : denies fever [0  -  Nothing at all] : 0, nothing at all [Class II - Mild Symptoms and Slight Limitations] : II [More Frequent Use Needed Recently] : Patient reports recent increase in frequency of [Never] : was never a smoker [___ Times a Day] : [unfilled] time(s) a day [None] : None [Adherent] : the patient is adherent with ~his/her~ medication regimen [Goals--Doing Well] : the patient is doing well with ~his/her~ goals [Wt Gain ___ Lbs] : no recent weight gain [Wt Loss ___ Lbs] : no recent weight loss [Oxygen] : the patient uses no supplemental oxygen [Good Control] : peak flow has been poor [Side Effects] : ~He/She~ denies medication side effects [FreeTextEntry1] : 79 year old female with PMH of Asthma, pancreatic CA s/p Whipple 5/2019 and starting chemo June 24th (chemo for 6 months) and starting on Xeloda (followed by Dr. Pruitt) and Pulmonary nodules.  She got a right sided chemo port placed on 6/17/2019.  She has home attendant M-W-F for 4-5 hours. \par \par She had a round of chemo 2 days ago and thinks the chemo will continue until October.  She is tolerating the chemo treatment.  She will then go for repeat CT scan at that time.  She is doing well. Appetite is good and maintaining her weight.  \par \par She denies any SOB she is able to walk and walk up stairs without SOB.  She denies any coughing, wheezing, edema or fever.  She is using the Breo with occasionally using the Ventolin as needed approx. 3 times a week.  She is walking without SOB and  doing her ADL.   She is not requiring the Ventolin as needed basis.  She denies any ER visit or Steriod use.\par \par Denies hemoptysis.  She denies any N/V, abdominal pain or blood in stool

## 2019-08-02 NOTE — PHYSICAL EXAM
[General Appearance - Well Developed] : well developed [Normal Appearance] : normal appearance [Well Groomed] : well groomed [General Appearance - Well Nourished] : well nourished [No Deformities] : no deformities [Normal Conjunctiva] : the conjunctiva exhibited no abnormalities [General Appearance - In No Acute Distress] : no acute distress [Eyelids - No Xanthelasma] : the eyelids demonstrated no xanthelasmas [Normal Oropharynx] : normal oropharynx [Neck Appearance] : the appearance of the neck was normal [Neck Cervical Mass (___cm)] : no neck mass was observed [Jugular Venous Distention Increased] : there was no jugular-venous distention [Thyroid Nodule] : there were no palpable thyroid nodules [Thyroid Diffuse Enlargement] : the thyroid was not enlarged [Heart Sounds] : normal S1 and S2 [Heart Rate And Rhythm] : heart rate and rhythm were normal [Murmurs] : no murmurs present [Exaggerated Use Of Accessory Muscles For Inspiration] : no accessory muscle use [Respiration, Rhythm And Depth] : normal respiratory rhythm and effort [Nail Clubbing] : no clubbing of the fingernails [Auscultation Breath Sounds / Voice Sounds] : lungs were clear to auscultation bilaterally [Cyanosis, Localized] : no localized cyanosis [Petechial Hemorrhages (___cm)] : no petechial hemorrhages [] : no ischemic changes

## 2019-08-07 ENCOUNTER — APPOINTMENT (OUTPATIENT)
Dept: INTERNAL MEDICINE | Facility: CLINIC | Age: 79
End: 2019-08-07
Payer: MEDICARE

## 2019-08-07 VITALS
HEART RATE: 78 BPM | HEIGHT: 56 IN | OXYGEN SATURATION: 97 % | DIASTOLIC BLOOD PRESSURE: 68 MMHG | BODY MASS INDEX: 23.84 KG/M2 | TEMPERATURE: 98.9 F | WEIGHT: 106 LBS | SYSTOLIC BLOOD PRESSURE: 125 MMHG

## 2019-08-07 DIAGNOSIS — Z23 ENCOUNTER FOR IMMUNIZATION: ICD-10-CM

## 2019-08-07 PROCEDURE — 99214 OFFICE O/P EST MOD 30 MIN: CPT | Mod: GC

## 2019-09-18 ENCOUNTER — OUTPATIENT (OUTPATIENT)
Dept: OUTPATIENT SERVICES | Facility: HOSPITAL | Age: 79
LOS: 1 days | End: 2019-09-18
Payer: MEDICARE

## 2019-09-18 ENCOUNTER — APPOINTMENT (OUTPATIENT)
Dept: ULTRASOUND IMAGING | Facility: HOSPITAL | Age: 79
End: 2019-09-18

## 2019-09-18 DIAGNOSIS — Z98.890 OTHER SPECIFIED POSTPROCEDURAL STATES: Chronic | ICD-10-CM

## 2019-09-18 PROCEDURE — 93970 EXTREMITY STUDY: CPT

## 2019-09-18 PROCEDURE — 93970 EXTREMITY STUDY: CPT | Mod: 26

## 2019-09-19 ENCOUNTER — APPOINTMENT (OUTPATIENT)
Dept: GERIATRICS | Facility: CLINIC | Age: 79
End: 2019-09-19
Payer: MEDICARE

## 2019-09-19 VITALS
BODY MASS INDEX: 24.66 KG/M2 | DIASTOLIC BLOOD PRESSURE: 80 MMHG | WEIGHT: 110 LBS | SYSTOLIC BLOOD PRESSURE: 120 MMHG | HEART RATE: 80 BPM

## 2019-09-19 DIAGNOSIS — M79.89 OTHER SPECIFIED SOFT TISSUE DISORDERS: ICD-10-CM

## 2019-09-19 PROCEDURE — 99214 OFFICE O/P EST MOD 30 MIN: CPT | Mod: 25

## 2019-09-19 PROCEDURE — 99497 ADVNCD CARE PLAN 30 MIN: CPT

## 2019-09-20 ENCOUNTER — APPOINTMENT (OUTPATIENT)
Dept: PULMONOLOGY | Facility: CLINIC | Age: 79
End: 2019-09-20
Payer: MEDICARE

## 2019-09-20 VITALS
HEIGHT: 56 IN | DIASTOLIC BLOOD PRESSURE: 76 MMHG | SYSTOLIC BLOOD PRESSURE: 110 MMHG | WEIGHT: 112 LBS | TEMPERATURE: 98.3 F | HEART RATE: 91 BPM | BODY MASS INDEX: 25.19 KG/M2 | OXYGEN SATURATION: 97 %

## 2019-09-20 DIAGNOSIS — R05 COUGH: ICD-10-CM

## 2019-09-20 PROCEDURE — 76604 US EXAM CHEST: CPT

## 2019-09-20 PROCEDURE — 99214 OFFICE O/P EST MOD 30 MIN: CPT | Mod: 25

## 2019-09-20 NOTE — HISTORY OF PRESENT ILLNESS
[Difficulty Breathing During Exertion] : denies dyspnea on exertion [Feelings Of Weakness On Exertion] : denies exercise intolerance [Cough] : denies coughing [Regional Soft Tissue Swelling Both Lower Extremities] : denies lower extremity edema [Wheezing] : denies wheezing [Chest Pain Or Discomfort] : denies chest pain [Fever] : denies fever [0  -  Nothing at all] : 0, nothing at all [Class II - Mild Symptoms and Slight Limitations] : II [More Frequent Use Needed Recently] : Patient reports recent increase in frequency of [___ Times a Day] : [unfilled] time(s) a day [Never] : was never a smoker [None] : None [Adherent] : the patient is adherent with ~his/her~ medication regimen [Goals--Doing Well] : the patient is doing well with ~his/her~ goals [Wt Gain ___ Lbs] : no recent weight gain [Wt Loss ___ Lbs] : no recent weight loss [Oxygen] : the patient uses no supplemental oxygen [Good Control] : peak flow has been poor [Side Effects] : ~He/She~ denies medication side effects [FreeTextEntry1] : 79 year old female with PMH of Asthma, pancreatic CA s/p Whipple 5/2019 and starting chemo June 24th (chemo for 6 months) and starting on Xeloda (followed by Dr. Pruitt) and Pulmonary nodules.  She got a right sided chemo port placed on 6/17/2019.  She has home attendant M-W-F for 5 hours. \par \par She is tolerating the chemo will continue until November.  She is tolerating the chemo treatment.  She will then go for repeat CT scan at that time.  She is doing well. Appetite is good and maintaining her weight.\par \par She started to have bilateral leg swelling that started 2 weeks ago Dr. Pruitt sent her for doppler that was negative.  She has intermittent pain in the legs.   \par \par She denies any SOB she is able to walk and walk up stairs without SOB.  She denies any coughing, wheezing, or  fever.  She is using the Breo with occasionally using the Ventolin as needed approx. 1 time a day.  She is walking without SOB and  doing her ADL. Denies any ER visits.  Denies hemoptysis.  She denies any N/V, abdominal pain or blood in stool

## 2019-09-20 NOTE — PHYSICAL EXAM
[General Appearance - Well Developed] : well developed [Normal Appearance] : normal appearance [Well Groomed] : well groomed [No Deformities] : no deformities [General Appearance - Well Nourished] : well nourished [General Appearance - In No Acute Distress] : no acute distress [Normal Conjunctiva] : the conjunctiva exhibited no abnormalities [Eyelids - No Xanthelasma] : the eyelids demonstrated no xanthelasmas [Normal Oropharynx] : normal oropharynx [Neck Cervical Mass (___cm)] : no neck mass was observed [Neck Appearance] : the appearance of the neck was normal [Thyroid Diffuse Enlargement] : the thyroid was not enlarged [Jugular Venous Distention Increased] : there was no jugular-venous distention [Heart Rate And Rhythm] : heart rate and rhythm were normal [Thyroid Nodule] : there were no palpable thyroid nodules [Murmurs] : no murmurs present [Heart Sounds] : normal S1 and S2 [Exaggerated Use Of Accessory Muscles For Inspiration] : no accessory muscle use [Respiration, Rhythm And Depth] : normal respiratory rhythm and effort [Auscultation Breath Sounds / Voice Sounds] : lungs were clear to auscultation bilaterally [Cyanosis, Localized] : no localized cyanosis [Nail Clubbing] : no clubbing of the fingernails [Petechial Hemorrhages (___cm)] : no petechial hemorrhages [] : no ischemic changes

## 2019-09-20 NOTE — ASSESSMENT
[FreeTextEntry1] : Pulmonary nodules\par \par Pt was originally referred to us for an abnormal CXR from her PCP Dr. Mercado.  She is now following PCP Dr. Burch. Follow up CT scan was performed October 9, 2018, with findings of multiple solid subcentimeter nodules largest 5 mm and signs of small and large airways disease with mild air trapping and tree and bud nodules. Incidental finding was 2 cystic structures, measuring 2 cm, in the pancreatic bed. \par \par Discussed pulmonary nodules could be due to scarring, inflammation or malignancy, but not limited too. Reviewed Fleischer society guidelines with pt and high risk due to smoking history. \par \par Plan \par \par - Follow up with repeat CT scan in one year (10/2019)  Pt to follow up with up with repeat CT scan with Dr. Pruitt to forward report.\par \par Asthma\par \par Pt is currently stable and rarely requiring her LEILA. Continue on Breo 200 and LEILA as needed. ACT score 23/25. Her cough has improved since last visit. Juan last visit without significant change from prior PFT.   Peak flow performed personal best 250.  Discussed action plan and when to call us.   She is to continue on current inhalers. \par \par FU after CT scan in October. \par \par Lower ext swelling\par \par 9/18/19 lower ext Doppler\par \par Impression: No DVT\par \par US of lungs performed during visit to r/o pleural effusion.  No signs of any fluid in the lungs.  She is to follow with echo ordered by Dr. Pruitt on 9/26/2019

## 2019-09-20 NOTE — PROCEDURE
[FreeTextEntry1] : 10/9/2018\par \par \par IMPRESSION: \par 1. Findings compatible with small and large airway disease, as described \par above. There is mild air trapping. \par 2. Multiple more discrete solid subcentimeter nodules are identified \par measuring up to 5 mm within the right upper lobe nodule in the anterior \par basal segment of the right lower lobe. Segment of the right lower lobe. As \par per Fleischner Society 2017 guidelines, an optional CT can be of value in 12 \par months. \par 3. There are 2 cystic structures, measuring up to 2 cm, in the pancreatic \par bed. Further characterization with a pancreatic MRI is suggested. \par 4. Nodular opacity seen on chest radiograph dated 9/28/2018 projecting over \par the lateral aspect right midlung zone is not identified on the thoracic CT \par examination. \par

## 2019-09-26 ENCOUNTER — OUTPATIENT (OUTPATIENT)
Dept: OUTPATIENT SERVICES | Facility: HOSPITAL | Age: 79
LOS: 1 days | End: 2019-09-26
Payer: MEDICARE

## 2019-09-26 DIAGNOSIS — R60.9 EDEMA, UNSPECIFIED: ICD-10-CM

## 2019-09-26 DIAGNOSIS — Z98.890 OTHER SPECIFIED POSTPROCEDURAL STATES: Chronic | ICD-10-CM

## 2019-09-26 PROCEDURE — 93306 TTE W/DOPPLER COMPLETE: CPT | Mod: 26

## 2019-09-26 PROCEDURE — 93306 TTE W/DOPPLER COMPLETE: CPT

## 2019-10-23 ENCOUNTER — EMERGENCY (EMERGENCY)
Facility: HOSPITAL | Age: 79
LOS: 1 days | Discharge: HOME | End: 2019-10-23
Attending: EMERGENCY MEDICINE | Admitting: INTERNAL MEDICINE
Payer: MEDICARE

## 2019-10-23 ENCOUNTER — OUTPATIENT (OUTPATIENT)
Dept: OUTPATIENT SERVICES | Facility: HOSPITAL | Age: 79
LOS: 1 days | End: 2019-10-23

## 2019-10-23 ENCOUNTER — APPOINTMENT (OUTPATIENT)
Dept: ULTRASOUND IMAGING | Facility: HOSPITAL | Age: 79
End: 2019-10-23
Payer: MEDICARE

## 2019-10-23 VITALS
SYSTOLIC BLOOD PRESSURE: 126 MMHG | TEMPERATURE: 99 F | DIASTOLIC BLOOD PRESSURE: 79 MMHG | RESPIRATION RATE: 18 BRPM | OXYGEN SATURATION: 99 % | HEART RATE: 89 BPM

## 2019-10-23 DIAGNOSIS — Z91.89 OTHER SPECIFIED PERSONAL RISK FACTORS, NOT ELSEWHERE CLASSIFIED: ICD-10-CM

## 2019-10-23 DIAGNOSIS — M79.89 OTHER SPECIFIED SOFT TISSUE DISORDERS: ICD-10-CM

## 2019-10-23 DIAGNOSIS — C25.9 MALIGNANT NEOPLASM OF PANCREAS, UNSPECIFIED: ICD-10-CM

## 2019-10-23 DIAGNOSIS — Z79.82 LONG TERM (CURRENT) USE OF ASPIRIN: ICD-10-CM

## 2019-10-23 DIAGNOSIS — Z98.890 OTHER SPECIFIED POSTPROCEDURAL STATES: Chronic | ICD-10-CM

## 2019-10-23 DIAGNOSIS — I82.402 ACUTE EMBOLISM AND THROMBOSIS OF UNSPECIFIED DEEP VEINS OF LEFT LOWER EXTREMITY: ICD-10-CM

## 2019-10-23 DIAGNOSIS — N39.0 URINARY TRACT INFECTION, SITE NOT SPECIFIED: ICD-10-CM

## 2019-10-23 DIAGNOSIS — Z90.49 ACQUIRED ABSENCE OF OTHER SPECIFIED PARTS OF DIGESTIVE TRACT: Chronic | ICD-10-CM

## 2019-10-23 DIAGNOSIS — J45.909 UNSPECIFIED ASTHMA, UNCOMPLICATED: ICD-10-CM

## 2019-10-23 DIAGNOSIS — I82.409 ACUTE EMBOLISM AND THROMBOSIS OF UNSPECIFIED DEEP VEINS OF UNSPECIFIED LOWER EXTREMITY: ICD-10-CM

## 2019-10-23 DIAGNOSIS — Z79.899 OTHER LONG TERM (CURRENT) DRUG THERAPY: ICD-10-CM

## 2019-10-23 DIAGNOSIS — R63.8 OTHER SYMPTOMS AND SIGNS CONCERNING FOOD AND FLUID INTAKE: ICD-10-CM

## 2019-10-23 LAB
ALBUMIN SERPL ELPH-MCNC: 4 G/DL — SIGNIFICANT CHANGE UP (ref 3.3–5)
ALP SERPL-CCNC: 111 U/L — SIGNIFICANT CHANGE UP (ref 40–120)
ALT FLD-CCNC: 20 U/L — SIGNIFICANT CHANGE UP (ref 10–45)
ANION GAP SERPL CALC-SCNC: 13 MMOL/L — SIGNIFICANT CHANGE UP (ref 5–17)
APTT BLD: 28.8 SEC — SIGNIFICANT CHANGE UP (ref 27.5–36.3)
AST SERPL-CCNC: 23 U/L — SIGNIFICANT CHANGE UP (ref 10–40)
BASOPHILS # BLD AUTO: 0 K/UL — SIGNIFICANT CHANGE UP (ref 0–0.2)
BASOPHILS NFR BLD AUTO: 0 % — SIGNIFICANT CHANGE UP (ref 0–2)
BILIRUB SERPL-MCNC: 0.2 MG/DL — SIGNIFICANT CHANGE UP (ref 0.2–1.2)
BUN SERPL-MCNC: 11 MG/DL — SIGNIFICANT CHANGE UP (ref 7–23)
CALCIUM SERPL-MCNC: 9.3 MG/DL — SIGNIFICANT CHANGE UP (ref 8.4–10.5)
CHLORIDE SERPL-SCNC: 102 MMOL/L — SIGNIFICANT CHANGE UP (ref 96–108)
CO2 SERPL-SCNC: 24 MMOL/L — SIGNIFICANT CHANGE UP (ref 22–31)
CREAT SERPL-MCNC: 0.57 MG/DL — SIGNIFICANT CHANGE UP (ref 0.5–1.3)
EOSINOPHIL # BLD AUTO: 0 K/UL — SIGNIFICANT CHANGE UP (ref 0–0.5)
EOSINOPHIL NFR BLD AUTO: 0 % — SIGNIFICANT CHANGE UP (ref 0–6)
GLUCOSE SERPL-MCNC: 243 MG/DL — HIGH (ref 70–99)
HCT VFR BLD CALC: 42.6 % — SIGNIFICANT CHANGE UP (ref 34.5–45)
HGB BLD-MCNC: 13.5 G/DL — SIGNIFICANT CHANGE UP (ref 11.5–15.5)
INR BLD: 1.07 — SIGNIFICANT CHANGE UP (ref 0.88–1.16)
LYMPHOCYTES # BLD AUTO: 0.32 K/UL — LOW (ref 1–3.3)
LYMPHOCYTES # BLD AUTO: 4.5 % — LOW (ref 13–44)
MCHC RBC-ENTMCNC: 31.5 PG — SIGNIFICANT CHANGE UP (ref 27–34)
MCHC RBC-ENTMCNC: 31.7 GM/DL — LOW (ref 32–36)
MCV RBC AUTO: 99.5 FL — SIGNIFICANT CHANGE UP (ref 80–100)
MONOCYTES # BLD AUTO: 0 K/UL — SIGNIFICANT CHANGE UP (ref 0–0.9)
MONOCYTES NFR BLD AUTO: 0 % — LOW (ref 2–14)
NEUTROPHILS # BLD AUTO: 6.48 K/UL — SIGNIFICANT CHANGE UP (ref 1.8–7.4)
NEUTROPHILS NFR BLD AUTO: 91.9 % — HIGH (ref 43–77)
NRBC # BLD: SIGNIFICANT CHANGE UP /100 WBCS (ref 0–0)
PLATELET # BLD AUTO: 228 K/UL — SIGNIFICANT CHANGE UP (ref 150–400)
POTASSIUM SERPL-MCNC: 4.7 MMOL/L — SIGNIFICANT CHANGE UP (ref 3.5–5.3)
POTASSIUM SERPL-SCNC: 4.7 MMOL/L — SIGNIFICANT CHANGE UP (ref 3.5–5.3)
PROT SERPL-MCNC: 7.6 G/DL — SIGNIFICANT CHANGE UP (ref 6–8.3)
PROTHROM AB SERPL-ACNC: 12.1 SEC — SIGNIFICANT CHANGE UP (ref 10–12.9)
RBC # BLD: 4.28 M/UL — SIGNIFICANT CHANGE UP (ref 3.8–5.2)
RBC # FLD: 18.4 % — HIGH (ref 10.3–14.5)
SODIUM SERPL-SCNC: 139 MMOL/L — SIGNIFICANT CHANGE UP (ref 135–145)
WBC # BLD: 7.05 K/UL — SIGNIFICANT CHANGE UP (ref 3.8–10.5)
WBC # FLD AUTO: 7.05 K/UL — SIGNIFICANT CHANGE UP (ref 3.8–10.5)

## 2019-10-23 PROCEDURE — 99222 1ST HOSP IP/OBS MODERATE 55: CPT | Mod: GC

## 2019-10-23 PROCEDURE — 99285 EMERGENCY DEPT VISIT HI MDM: CPT

## 2019-10-23 PROCEDURE — 93010 ELECTROCARDIOGRAM REPORT: CPT

## 2019-10-23 PROCEDURE — 93971 EXTREMITY STUDY: CPT | Mod: 26,LT

## 2019-10-23 RX ORDER — ENOXAPARIN SODIUM 100 MG/ML
70 INJECTION SUBCUTANEOUS ONCE
Refills: 0 | Status: COMPLETED | OUTPATIENT
Start: 2019-10-23 | End: 2019-10-23

## 2019-10-23 RX ORDER — IPRATROPIUM/ALBUTEROL SULFATE 18-103MCG
3 AEROSOL WITH ADAPTER (GRAM) INHALATION ONCE
Refills: 0 | Status: COMPLETED | OUTPATIENT
Start: 2019-10-23 | End: 2019-10-23

## 2019-10-23 RX ORDER — IPRATROPIUM/ALBUTEROL SULFATE 18-103MCG
3 AEROSOL WITH ADAPTER (GRAM) INHALATION EVERY 6 HOURS
Refills: 0 | Status: DISCONTINUED | OUTPATIENT
Start: 2019-10-23 | End: 2019-10-24

## 2019-10-23 RX ORDER — PROCHLORPERAZINE MALEATE 5 MG
0 TABLET ORAL
Qty: 0 | Refills: 0 | DISCHARGE

## 2019-10-23 RX ORDER — CAPECITABINE 500 MG/1
4 TABLET ORAL
Qty: 0 | Refills: 0 | DISCHARGE

## 2019-10-23 RX ORDER — FLUTICASONE FUROATE AND VILANTEROL TRIFENATATE 100; 25 UG/1; UG/1
1 POWDER RESPIRATORY (INHALATION)
Qty: 0 | Refills: 0 | DISCHARGE

## 2019-10-23 RX ORDER — ENOXAPARIN SODIUM 100 MG/ML
72 INJECTION SUBCUTANEOUS EVERY 24 HOURS
Refills: 0 | Status: DISCONTINUED | OUTPATIENT
Start: 2019-10-24 | End: 2019-10-24

## 2019-10-23 RX ADMIN — ENOXAPARIN SODIUM 70 MILLIGRAM(S): 100 INJECTION SUBCUTANEOUS at 20:58

## 2019-10-23 RX ADMIN — Medication 3 MILLILITER(S): at 22:46

## 2019-10-23 NOTE — H&P ADULT - PROBLEM SELECTOR PLAN 2
Follows Dr. Pruitt. S/p Whipple procedure in May 2019. Started chemo in July 2019.   -collateral with Dr. Pruitt   -outpatient management

## 2019-10-23 NOTE — H&P ADULT - NSHPSOCIALHISTORY_GEN_ALL_CORE
Tobacco use: former smoker. quit 35 years ago. Smoked 2 packs per week for 20 years  EtOH use: social  Illicit drug use: denies    Living situation: Lives with sister. Ambulates without assistance. DASH M (1-5), W (12-5), F (1-5)

## 2019-10-23 NOTE — ED ADULT NURSE NOTE - OBJECTIVE STATEMENT
Pt presents to ED complaining of left lower leg extremity and pain x 4 days. Pt states "I was at my oncologist this morning getting chemo and I showed them my leg. They did a scan and they said I have clots in my leg". Lower left extremity is edematous, red, tender to touch and warm. Cap refill less than 3 seconds. Pt a&ox3. Denies SOB, chest pain, fevers, chills, n/v/d.

## 2019-10-23 NOTE — ED PROVIDER NOTE - OBJECTIVE STATEMENT
78 y/o F with PMHx asthma and pancreatic cancer (diagnosed in May 2019, receiving chemo) presents to the ED with intermittent LLE swelling x 4 days. Pt states she went to get chemo treatment today where she had an ultrasound showing a DVT in her L leg. In the ED, pt reports swelling has improved since 3-4 days ago. Denies trauma/injury, difficulty breathing, SOB, chest pain, numbness, weakness or history of kidney issues. Follows with Dr. Shaina Burch (PCP) and Dr. Pruitt (oncologist). 79 year old female with PMH of Asthma, pancreatic CA s/p Whipple 5/2019 and starting chemo June 24th (chemo for 6 months) and starting on Xeloda (followed by Dr. Pruitt) and Pulmonary nodules with R sided chemo port placed on 6/17/2019 presents to the ED with intermittent LLE swelling x 4 days. Pt states she went to get chemo treatment today where she had an ultrasound showing a DVT in her L leg. In the ED, pt reports swelling has improved since 3-4 days ago. Denies trauma/injury, difficulty breathing, SOB, chest pain, coughing, wheezing, fever, numbness, weakness or history of kidney issues.  Of note, pt reported the same symptoms 1 month ago and had a doppler that was negative. Follows with Dr. Shaina Burch (PCP) and Dr. Pruitt (oncologist). 79 year old female with PMH of Asthma, pancreatic CA s/p Whipple 5/2019 , on chemotherapy (followed by Dr. Pruitt)  presents to the ED with intermittent LLE swelling x 4 days. Pt states she went to get chemo treatment today where she had an ultrasound showing a DVT in her L leg. In the ED, pt reports swelling has improved since 3-4 days ago. Denies trauma/injury, difficulty breathing, SOB, chest pain, coughing, wheezing, fever, numbness, weakness or history of kidney issues.  Of note, pt reported the same symptoms 1 month ago and had a doppler that was negative. Follows with Dr. Shaina Burch (PCP) and Dr. Pruitt (oncologist).

## 2019-10-23 NOTE — H&P ADULT - NSICDXPASTMEDICALHX_GEN_ALL_CORE_FT
PAST MEDICAL HISTORY:  Asthma     History of kidney stones     Pancreatic cancer s/p whipple 05/2019

## 2019-10-23 NOTE — H&P ADULT - HISTORY OF PRESENT ILLNESS
Patient is an 79 year old female with PMH of Asthma, pancreatic CA s/p Whipple 5/2019 , on chemotherapy (followed by Dr. Pruitt) presenting for LLE swelling x4 days.     In the ED: VS T 98.8, HR 89, /79, RR 16-18, SpO2 99% RA.  Labs s/f gluc 243. US LLE demonstrated acute deep venous thrombosis of the calf veins as well as superficial venous thrombosis of the proximal and mid greater saphenous veins. Patient received SubQ Lovenox 70mg x1. Patient is an 79 year old female with PMH of Asthma, pancreatic CA s/p Whipple 5/2019 on chemotherapy (followed by Dr. Pruitt) presenting for LLE swelling. Patient reports that she has had chronic LLE swelling that has been on and off since September - at that time she had an US LE and no DVT was present. States that for the past week there has been worsening LLE swelling, pain, redness, and warmth without leg numbness or weakness. Notified her outpatient providers and was informed to keep leg elevated and place ice packs on her leg, but it did not improve. On day of admission, she presented for chemotherapy and had an US of LE, which was positive for DVT so was told to come to the ED. Patient denies fevers, chills, night sweats, vision changes, rhinorrhea, sore throat, cough, SOB, chest pain, dyspnea on exertion, anginal chest pain, orthopnea, abdominal pain, back pain, n/v/d/c, dysuria, foul smelling odor of urine, hematuria, swelling in other extremities. Patient admits to increased urinary freq and decreased appetite for last 3-5 days.     In the ED: VS T 98.8, HR 89, /79, RR 16-18, SpO2 99% RA.  Labs s/f gluc 243. US LLE demonstrated acute deep venous thrombosis of the calf veins as well as superficial venous thrombosis of the proximal and mid greater saphenous veins. Patient received SubQ Lovenox 70mg x1.

## 2019-10-23 NOTE — H&P ADULT - PROBLEM SELECTOR PLAN 3
History of asthma on home ventolin and breo elipta. Mild wheezing on exam from admission  -c/w duoneb PRN Reports polyuria without urgency or dysuria  -Check A1C  -UA  -Start SSI

## 2019-10-23 NOTE — H&P ADULT - PROBLEM SELECTOR PLAN 4
Patient admitted to increased urinary freq over last few days without other signs or symptoms of infection.   -f/u UA. In the setting of immunocompromised patient from cancer would consider treating History of asthma on home ventolin and breo elipta. Mild wheezing on exam from admission  -c/w duoneb PRN

## 2019-10-23 NOTE — H&P ADULT - PROBLEM SELECTOR PLAN 1
LLE DVT found on US LLE in the setting of worsening LLE swelling, pain, redness, and warmth x1 week without leg numbness or weakness. US LLE demonstrated acute deep venous thrombosis of the calf veins as well as superficial venous thrombosis of the proximal and mid greater saphenous veins. Patient received SubQ Lovenox 70mg x1.  No signs or symptoms of PE - saturating well on RA and no tachycardia, no CP, no SOB. Likely unprovoked in the setting of pancreatic cancer  -c/w SubQ Lovenox at 1.5mg/kg (72mg) q24hrs  -collateral from Dr. Pruitt in the AM  -social work consulted for establishing teaching and nursing for help with subq injections with lovenox

## 2019-10-23 NOTE — ED PROVIDER NOTE - CLINICAL SUMMARY MEDICAL DECISION MAKING FREE TEXT BOX
78 yo female with h/o asthma, pancreatic CA, s/p Whipple  on 05/2019, on chemotherapy, sent to ER after she had left LE doppler done earlier today. Findings consistent with DVT. pt reports pain and swelling to her left lower leg for the past few days. No SOB, no CP, no known injury to LE. pt well appearing and in NAD. Case discussed with , covering , pt recommended to be placed on Lovenox.  will admit for further management.

## 2019-10-23 NOTE — ED ADULT TRIAGE NOTE - CHIEF COMPLAINT QUOTE
Patient c/o LLE swelling for 4 days , had sonogram today showing DVT , Not on thinners . Denies any injury ,.

## 2019-10-23 NOTE — ED PROVIDER NOTE - RESPIRATORY NEGATIVE STATEMENT, MLM
no difficulty breathing, no shortness of breath. no difficulty breathing, no shortness of breath, no coughing, no wheezing.

## 2019-10-23 NOTE — H&P ADULT - PROBLEM SELECTOR PLAN 6
1) PCP Contacted on Admission: (Y/N) --> Name & Phone #: Dr. Burch in the AM  2) Date of Contact with PCP:  3) PCP Contacted at Discharge: (Y/N, N/A)  4) Summary of Handoff Given to PCP:   5) Post-Discharge Appointment Date and Location:

## 2019-10-23 NOTE — ED PROVIDER NOTE - ATTENDING CONTRIBUTION TO CARE
Addendum to attending statement: I have reviewed the ACP note and agree with the history, exam, and plan of care. I  was available to STACEY Pedraza  as a supervising provider if needed.   Pt w/ PMH of Asthma, pancreatic CA s/p Whipple 5/2019 , on chemotherapy (followed by Dr. Pruitt)  presents to the ED with intermittent LLE swelling x 4 days. Pt states she went to get chemo treatment today where she had an ultrasound showing a DVT in her L leg. In the ED, pt reports swelling has improved since 3-4 days ago. Denies trauma/injury, difficulty breathing, SOB, chest pain, coughing, wheezing, fever, numbness, weakness or history of kidney issues.  Of note, pt reported the same symptoms 1 month ago and had a doppler that was negative. Follows with Dr. Shaina Burch (PCP) and Dr. Pruitt (oncologist).   In the setting of malignancy w/ DVT, onc requesting tx'd w/ Lovenox. Pt not comfortable w/ plan and does not feel she can administer injections. D/w CM - admit to medicine, start Lovenox, will arrange education and care for injections at home.

## 2019-10-23 NOTE — H&P ADULT - ASSESSMENT
Patient is an 79 year old female with PMH of Asthma, pancreatic CA s/p Whipple 5/2019 on chemotherapy (followed by Dr. Pruitt) presenting for LLE swelling found to have acute DVT.

## 2019-10-23 NOTE — ED PROVIDER NOTE - MUSCULOSKELETAL, MLM
Spine appears normal, range of motion is not limited, no muscle or joint tenderness, b/l LE edema, L>R, good distal pulses, no discolorations,

## 2019-10-23 NOTE — H&P ADULT - NSHPPHYSICALEXAM_GEN_ALL_CORE
.  VITAL SIGNS:  T(C): 36.7 (10-23-19 @ 21:00), Max: 37.1 (10-23-19 @ 17:48)  T(F): 98 (10-23-19 @ 21:00), Max: 98.8 (10-23-19 @ 17:48)  HR: 100 (10-23-19 @ 21:00) (89 - 100)  BP: 125/75 (10-23-19 @ 21:00) (125/75 - 126/79)  BP(mean): --  RR: 16 (10-23-19 @ 21:00) (16 - 18)  SpO2: 96% (10-23-19 @ 21:00) (96% - 99%)  Wt(kg): --    PHYSICAL EXAM:    Constitutional: WDWN resting comfortably in bed; NAD  Head: NC/AT  Eyes: PERRL, EOMI, clear conjunctiva  ENT: no nasal discharge; uvula midline, no oropharyngeal erythema or exudates; MMM  Neck: supple; no JVD   Respiratory: Mild inspiratory wheezing heard throughout in b/l lung fields.   Cardiac: +S1/S2; RRR; no M/R/G  Gastrointestinal: soft, NT/ND; no rebound or guarding; +BSx4  Back: spine midline, no bony tenderness or step-offs; no CVAT B/L  Extremities: Mild tenderness to palpation greatest along the medial aspect of LLE superior to the ankle. Trace to 1+ pitting edema of LLE to distal shin. No erythema, no cyanosis.   Vascular: 2+ radial,  DP/PT pulses B/L  Neurologic: AAOx3; CNII-XII grossly intact; no focal deficits

## 2019-10-23 NOTE — H&P ADULT - NSHPLABSRESULTS_GEN_ALL_CORE
.  LABS:                         13.5   7.05  )-----------( 228      ( 23 Oct 2019 19:07 )             42.6     10-23    139  |  102  |  11  ----------------------------<  243<H>  4.7   |  24  |  0.57    Ca    9.3      23 Oct 2019 19:07    TPro  7.6  /  Alb  4.0  /  TBili  0.2  /  DBili  x   /  AST  23  /  ALT  20  /  AlkPhos  111  10-23    PT/INR - ( 23 Oct 2019 19:07 )   PT: 12.1 sec;   INR: 1.07          PTT - ( 23 Oct 2019 19:07 )  PTT:28.8 sec              RADIOLOGY, EKG & ADDITIONAL TESTS: Reviewed.   < from: US Duplex Venous Lower Ext Ltd, Left (10.23.19 @ 17:08) >    IMPRESSION:    Acute deep venous thrombosis of the calf veins as well as superficial   venous thrombosis of the proximal and mid greater saphenous veins.

## 2019-10-23 NOTE — ED PROVIDER NOTE - CHPI ED SYMPTOMS NEG
no difficulty breathing, no SOB, no chest pain./no weakness/no numbness no fever/no weakness/no difficulty breathing, no SOB, no chest pain., no coughing, no wheezing/no numbness

## 2019-10-24 ENCOUNTER — TRANSCRIPTION ENCOUNTER (OUTPATIENT)
Age: 79
End: 2019-10-24

## 2019-10-24 ENCOUNTER — INBOUND DOCUMENT (OUTPATIENT)
Age: 79
End: 2019-10-24

## 2019-10-24 VITALS
SYSTOLIC BLOOD PRESSURE: 114 MMHG | OXYGEN SATURATION: 96 % | RESPIRATION RATE: 18 BRPM | HEART RATE: 99 BPM | TEMPERATURE: 98 F | DIASTOLIC BLOOD PRESSURE: 71 MMHG

## 2019-10-24 DIAGNOSIS — R73.9 HYPERGLYCEMIA, UNSPECIFIED: ICD-10-CM

## 2019-10-24 LAB
ANION GAP SERPL CALC-SCNC: 11 MMOL/L — SIGNIFICANT CHANGE UP (ref 5–17)
BUN SERPL-MCNC: 15 MG/DL — SIGNIFICANT CHANGE UP (ref 7–23)
CALCIUM SERPL-MCNC: 9.1 MG/DL — SIGNIFICANT CHANGE UP (ref 8.4–10.5)
CHLORIDE SERPL-SCNC: 101 MMOL/L — SIGNIFICANT CHANGE UP (ref 96–108)
CO2 SERPL-SCNC: 24 MMOL/L — SIGNIFICANT CHANGE UP (ref 22–31)
CREAT SERPL-MCNC: 0.6 MG/DL — SIGNIFICANT CHANGE UP (ref 0.5–1.3)
GLUCOSE SERPL-MCNC: 261 MG/DL — HIGH (ref 70–99)
HBA1C BLD-MCNC: 7.6 % — HIGH (ref 4–5.6)
HCT VFR BLD CALC: 36.6 % — SIGNIFICANT CHANGE UP (ref 34.5–45)
HGB BLD-MCNC: 11.4 G/DL — LOW (ref 11.5–15.5)
MAGNESIUM SERPL-MCNC: 2.2 MG/DL — SIGNIFICANT CHANGE UP (ref 1.6–2.6)
MCHC RBC-ENTMCNC: 31 PG — SIGNIFICANT CHANGE UP (ref 27–34)
MCHC RBC-ENTMCNC: 31.1 GM/DL — LOW (ref 32–36)
MCV RBC AUTO: 99.5 FL — SIGNIFICANT CHANGE UP (ref 80–100)
NRBC # BLD: 1 /100 WBCS — HIGH (ref 0–0)
PHOSPHATE SERPL-MCNC: 4.3 MG/DL — SIGNIFICANT CHANGE UP (ref 2.5–4.5)
PLATELET # BLD AUTO: 212 K/UL — SIGNIFICANT CHANGE UP (ref 150–400)
POTASSIUM SERPL-MCNC: 4.5 MMOL/L — SIGNIFICANT CHANGE UP (ref 3.5–5.3)
POTASSIUM SERPL-SCNC: 4.5 MMOL/L — SIGNIFICANT CHANGE UP (ref 3.5–5.3)
RBC # BLD: 3.68 M/UL — LOW (ref 3.8–5.2)
RBC # FLD: 18.1 % — HIGH (ref 10.3–14.5)
SODIUM SERPL-SCNC: 136 MMOL/L — SIGNIFICANT CHANGE UP (ref 135–145)
WBC # BLD: 6.29 K/UL — SIGNIFICANT CHANGE UP (ref 3.8–10.5)
WBC # FLD AUTO: 6.29 K/UL — SIGNIFICANT CHANGE UP (ref 3.8–10.5)

## 2019-10-24 PROCEDURE — 93005 ELECTROCARDIOGRAM TRACING: CPT

## 2019-10-24 PROCEDURE — 85027 COMPLETE CBC AUTOMATED: CPT

## 2019-10-24 PROCEDURE — 96372 THER/PROPH/DIAG INJ SC/IM: CPT

## 2019-10-24 PROCEDURE — 94640 AIRWAY INHALATION TREATMENT: CPT

## 2019-10-24 PROCEDURE — 99285 EMERGENCY DEPT VISIT HI MDM: CPT | Mod: 25

## 2019-10-24 PROCEDURE — 36415 COLL VENOUS BLD VENIPUNCTURE: CPT

## 2019-10-24 PROCEDURE — 80053 COMPREHEN METABOLIC PANEL: CPT

## 2019-10-24 PROCEDURE — 85730 THROMBOPLASTIN TIME PARTIAL: CPT

## 2019-10-24 PROCEDURE — 93971 EXTREMITY STUDY: CPT

## 2019-10-24 PROCEDURE — 84100 ASSAY OF PHOSPHORUS: CPT

## 2019-10-24 PROCEDURE — 85025 COMPLETE CBC W/AUTO DIFF WBC: CPT

## 2019-10-24 PROCEDURE — 85610 PROTHROMBIN TIME: CPT

## 2019-10-24 PROCEDURE — 80048 BASIC METABOLIC PNL TOTAL CA: CPT

## 2019-10-24 PROCEDURE — 83036 HEMOGLOBIN GLYCOSYLATED A1C: CPT

## 2019-10-24 PROCEDURE — 99282 EMERGENCY DEPT VISIT SF MDM: CPT | Mod: GC

## 2019-10-24 PROCEDURE — 83735 ASSAY OF MAGNESIUM: CPT

## 2019-10-24 RX ORDER — APIXABAN 2.5 MG/1
2 TABLET, FILM COATED ORAL
Qty: 28 | Refills: 0
Start: 2019-10-24 | End: 2019-10-30

## 2019-10-24 RX ORDER — APIXABAN 2.5 MG/1
2 TABLET, FILM COATED ORAL
Qty: 38 | Refills: 0
Start: 2019-10-24 | End: 2019-11-02

## 2019-10-24 RX ORDER — APIXABAN 2.5 MG/1
2 TABLET, FILM COATED ORAL
Qty: 0 | Refills: 0 | DISCHARGE
Start: 2019-10-24

## 2019-10-24 RX ORDER — APIXABAN 2.5 MG/1
1 TABLET, FILM COATED ORAL
Qty: 60 | Refills: 0
Start: 2019-10-24 | End: 2019-11-22

## 2019-10-24 RX ORDER — APIXABAN 2.5 MG/1
10 TABLET, FILM COATED ORAL EVERY 12 HOURS
Refills: 0 | Status: DISCONTINUED | OUTPATIENT
Start: 2019-10-24 | End: 2019-10-24

## 2019-10-24 RX ADMIN — APIXABAN 10 MILLIGRAM(S): 2.5 TABLET, FILM COATED ORAL at 11:54

## 2019-10-24 NOTE — DISCHARGE NOTE PROVIDER - NSDCCPCAREPLAN_GEN_ALL_CORE_FT
PRINCIPAL DISCHARGE DIAGNOSIS  Diagnosis: DVT (deep venous thrombosis)  Assessment and Plan of Treatment: You came in with left lower extremity swelling and pain and were found to have a deep vein thrombus in the left leg. You were started on lovenox injections and now have been transtioned to an oral medication called eliquis. You will take eliquis 10mg every 12 hours for 7 days. You will then take eliquis 5mg every 12 hours after that, starting on 10/31/19. Your prescriptions have been sent to your pharmacy. Please follow up with your PCP within 1 week of discharge.      SECONDARY DISCHARGE DIAGNOSES  Diagnosis: Diabetes  Assessment and Plan of Treatment: You were found to have elevated blood sugar on arrival to the hospital. An average of your blood sugar for the last 3 months was also elevated, at 7.6. You meet criteria for diagnosis of Type 2 Diabetes. We recommend you follow up with your PCP within 1 week of discharge to discuss this and future management. PRINCIPAL DISCHARGE DIAGNOSIS  Diagnosis: DVT (deep venous thrombosis)  Assessment and Plan of Treatment: You came in with left lower extremity swelling and pain and were found to have a deep vein thrombus in the left leg. You were started on lovenox injections and now have been transtioned to an oral medication called eliquis. You will take eliquis 10mg every 12 hours for 7 days which your insurance has accepted. You will then take eliquis 5mg every 12 hours after that, starting on 10/31/19; however please see Marry ALMAZAN in [facility] clinic for further refills of medications. Your prescriptions have been sent to your pharmacy. Please follow up with your PCP within 1 week of discharge.      SECONDARY DISCHARGE DIAGNOSES  Diagnosis: Diabetes  Assessment and Plan of Treatment: You were found to have elevated blood sugar on arrival to the hospital. An average of your blood sugar for the last 3 months was also elevated, at 7.6. You meet criteria for diagnosis of Type 2 Diabetes. We recommend you follow up with your PCP within 1 week of discharge to discuss this and future management.

## 2019-10-24 NOTE — DISCHARGE NOTE NURSING/CASE MANAGEMENT/SOCIAL WORK - PATIENT PORTAL LINK FT
You can access the FollowMyHealth Patient Portal offered by [facility] by registering at the following website: [email or web address]. By joining [employer]’s FollowMyHealth portal, you will also be able to view your health information using other applications (apps) compatible with our system.

## 2019-10-24 NOTE — PROGRESS NOTE ADULT - PROBLEM SELECTOR PLAN 5
F: No IVF  E: Replete PRN  N: Regular diet  AC: Lovenox  Dispo: RMF F: No IVF  E: Replete K<4, Mg<2  N: Regular diet  AC: Lovenox  Dispo: RMF

## 2019-10-24 NOTE — DISCHARGE NOTE PROVIDER - NSDCFUSCHEDAPPT_GEN_ALL_CORE_FT
[facility] ; 11/01/2019 ; [facility] PulmMed [address] [facility] ; 11/05/2019 ; [facility] Med GenInt [address] [facility] ; 11/01/2019 ; NPP PulmMed [address] [facility] ; 11/05/2019 ; [facility] Med GenInt [address] [facility] ; 11/01/2019 ; NPP PulmMed [address] [facility] ; 11/05/2019 ; [facility] Med GenInt [address] [facility] ; 10/28/2019 ; [facility] Med GenInt [address] [facility] ; 11/01/2019 ; [facility] PulmMed [address] ; 11/05/2019 ; [facility] Med GenInt [address]  [facility] ; 10/28/2019 ; [facility] Med GenInt [address] [facility] ; 11/01/2019 ; [facility] PulmMed [address] ; 11/05/2019 ; [facility] Med GenInt [address]

## 2019-10-24 NOTE — PROGRESS NOTE ADULT - PROBLEM SELECTOR PLAN 2
Follows Dr. Pruitt. S/p Whipple procedure in May 2019. Started chemo in July 2019.   -collateral with Dr. Pruitt   -outpatient management Follows Dr. Pruitt. S/p Whipple procedure in May 2019. Started chemo in July 2019.   -outpatient management

## 2019-10-24 NOTE — DISCHARGE NOTE PROVIDER - CARE PROVIDER_API CALL
Shaina Burch (DO)  Geriatric Medicine  [address] [address]  Phone: [phone]  Fax: [phone]  Follow Up Time: 1 week

## 2019-10-24 NOTE — DISCHARGE NOTE PROVIDER - HOSPITAL COURSE
Pt is an 80 yo F with PMH asthma, pancreatic CA (s/p Whipple 5/2019 and on chemotherapy - Dr. Pruitt) who presents with LLE swelling. LLE swelling has has been chronic and intermittent since Sept of this year, was seen at onset with US LE neg DVT. Swelling has been acutely worse x1 wk with pain, erythema, and warmth; without numbness or weakness. Pt informed PCP who recommended elevation and ice packs without relief. Pt then went for regularly scheduled chemo appointment where she had a LE US pos DVT and was sent to ED. Pt denies F/C/night sweats/changes to vision/URI symptoms/CP/SOB/abd pain/bowel changes. Pt also with urinary frequency and dec PO x3-5d. In the ED, pt afebrile, HR 89, /79, saturating 99% on RA. Labs with glucose 243. US LLE pos DVT calf veins and superficial venous thrombosis of proximal and mid-greater saphenous veins. Pt received sq Lovenox 70mg x1 and was admitted to [facility] for further observation and management.        Problem List/Main Diagnoses (system-based):     DVT - LLE 2/2 malignancy now on eliquis        Pancreatic cancer - follows with Dr. Pruitt        Asthma        T2D - A1c 7.6, will send to PCP for outpt medical management initiation         Inpatient treatment course: Lovenox 72mg sq q24h transitioned to Eliquis 10mg BID 7 d        New medications: Eliquis 10 mg BID 7 d, Eliquis 5mg BID starting 10/31        Labs to be followed outpatient: none        Exam to be followed outpatient: none Pt is an 80 yo F with PMH asthma, pancreatic CA (s/p Whipple 5/2019 and on chemotherapy - Dr. Pruitt) who presents with LLE swelling. LLE swelling has has been chronic and intermittent since Sept of this year, was seen at onset with US LE neg DVT. Swelling has been acutely worse x1 wk with pain, erythema, and warmth; without numbness or weakness. Pt informed PCP who recommended elevation and ice packs without relief. Pt then went for regularly scheduled chemo appointment where she had a LE US pos DVT and was sent to ED. Pt denies F/C/night sweats/changes to vision/URI symptoms/CP/SOB/abd pain/bowel changes. Pt also with urinary frequency and dec PO x3-5d. In the ED, pt afebrile, HR 89, /79, saturating 99% on RA. Labs with glucose 243. US LLE pos DVT calf veins and superficial venous thrombosis of proximal and mid-greater saphenous veins. Pt received sq Lovenox 70mg x1 and was admitted to [facility] for further observation and management.        Problem List/Main Diagnoses (system-based):     DVT - LLE 2/2 malignancy now on eliquis        Pancreatic cancer - follows with Dr. Pruitt        Asthma        T2D - A1c 7.6, will send to PCP for outpt medical management initiation         Inpatient treatment course: Lovenox 72mg sq q24h transitioned to Eliquis 10mg BID 7 d        New medications: Eliquis 10 mg BID 7 d, Eliquis 5mg BID starting 10/31        Labs to be followed outpatient: none        Exam to be followed outpatient: Right lower extremity

## 2019-10-24 NOTE — PROGRESS NOTE ADULT - PROBLEM SELECTOR PLAN 4
History of asthma on home ventolin and breo elipta. Mild wheezing on exam from admission  -c/w duoneb PRN

## 2019-10-24 NOTE — PROGRESS NOTE ADULT - ASSESSMENT
78 yo F with PMH asthma, pancreatic CA (s/p Whipple 5/2019 and on chemotherapy - Dr. Pruitt) who presents with LLE swelling. US LLE pos DVT calf veins and superficial venous thrombosis of proximal and mid-greater saphenous veins. Admitted to [facility] for further observation and management.

## 2019-10-24 NOTE — PROGRESS NOTE ADULT - PROBLEM SELECTOR PLAN 1
LLE DVT found on US LLE in the setting of worsening LLE swelling, pain, redness, and warmth x1 week without leg numbness or weakness. US LLE demonstrated acute deep venous thrombosis of the calf veins as well as superficial venous thrombosis of the proximal and mid greater saphenous veins. Patient received SubQ Lovenox 70mg x1.  No signs or symptoms of PE - saturating well on RA and no tachycardia, no CP, no SOB. Likely unprovoked in the setting of pancreatic cancer  -c/w SubQ Lovenox at 1.5mg/kg (72mg) q24hrs  -collateral from Dr. Pruitt in the AM  -social work consulted for establishing teaching and nursing for help with subq injections with lovenox LLE DVT found on US LLE in the setting of worsening LLE swelling, pain, redness, and warmth x1 week without leg numbness or weakness. US LLE demonstrated acute deep venous thrombosis of the calf veins as well as superficial venous thrombosis of the proximal and mid greater saphenous veins. Patient received SubQ Lovenox 70mg x1.  No signs or symptoms of PE - saturating well on RA and no tachycardia, no CP, no SOB. Likely unprovoked in the setting of pancreatic cancer  -c/w SubQ Lovenox at 1.5mg/kg (72mg) q24hrs  -transition to eliquis 10mg BID 7d then 5mg BID   -social work consulted for establishing teaching and nursing for help with subq injections with lovenox

## 2019-10-24 NOTE — PROGRESS NOTE ADULT - PROBLEM SELECTOR PLAN 3
Reports polyuria without urgency or dysuria  -Check A1C  -UA  -Start SSI Reports polyuria without urgency or dysuria  -A1c 7.6 - new dx diabetes, will send pt for PCP f/u on dc  -SSI

## 2019-10-24 NOTE — PROGRESS NOTE ADULT - SUBJECTIVE AND OBJECTIVE BOX
Hospital Course:  Pt is an 80 yo F with PMH asthma, pancreatic CA (s/p Whipple 5/2019 and on chemotherapy - Dr. Pruitt) who presents with LLE swelling. LLE swelling has has been chronic and intermittent since Sept of this year, was seen at onset with US LE neg DVT. Swelling has been acutely worse x1 wk with pain, erythema, and warmth; without numbness or weakness. Pt informed PCP who recommended elevation and ice packs without relief. Pt then went for regularly scheduled chemo appointment where she had a LE US pos DVT and was sent to ED. Pt denies F/C/night sweats/changes to vision/URI symptoms/CP/SOB/abd pain/bowel changes. Pt also with urinary frequency and dec PO x3-5d. In the ED, pt afebrile, HR 89, /79, saturating 99% on RA. Labs with glucose 243. US LLE pos DVT calf veins and superficial venous thrombosis of proximal and mid-greater saphenous veins. Pt received sq Lovenox 70mg x1 and was admitted to [facility] for further observation and management.    Subjective:  Pt seen and examined at bedside in no acute distress. Says she feels well. Is able to ambulate without assistance. Feels hungry. Increased urinary frequency x3-5d. Has never been told she had diabetes.    REVIEW OF SYSTEMS:    CONSTITUTIONAL: No weakness, fevers or chills.   EYES/ENT: No visual changes;  No vertigo or throat pain   NECK: No pain or stiffness  RESPIRATORY: No cough, wheezing, hemoptysis; No shortness of breath  CARDIOVASCULAR: No chest pain or palpitations  GASTROINTESTINAL: No abdominal or epigastric pain. No nausea, vomiting, or hematemesis; No diarrhea or constipation. No melena or hematochezia.  GENITOURINARY: No dysuria or hematuria. +frequency  NEUROLOGICAL: No numbness or weakness  SKIN: No itching, burning, or rashes; +LLE swelling > RLE, mild pain at L ankle  All other review of systems is negative unless indicated above.    VITAL SIGNS:  T(C): 36.8 (10-24-19 @ 09:21), Max: 37.1 (10-23-19 @ 17:48)  T(F): 98.3 (10-24-19 @ 09:21), Max: 98.8 (10-23-19 @ 17:48)  HR: 99 (10-24-19 @ 09:21) (75 - 100)  BP: 114/71 (10-24-19 @ 09:21) (113/68 - 126/79)  BP(mean): --  RR: 18 (10-24-19 @ 09:21) (16 - 18)  SpO2: 96% (10-24-19 @ 09:21) (95% - 99%)  Wt(kg): --    PHYSICAL EXAM:  Constitutional: WDWN resting comfortably in bed; NAD  Head: NC/AT  Eyes: PERRL, EOMI, anicteric sclera  ENT: no nasal discharge; MMM  Neck: supple; no JVD    Respiratory: CTA B/L; no W/R/R, no retractions  Cardiac: +S1/S2; RRR; no M/R/G   Gastrointestinal: soft, NT/ND; no rebound or guarding; +BSx4; no suprapubic tenderness  Back: no CVAT B/L  Extremities: WWP, no clubbing or cyanosis; 1+ edema LLE to distal shin with pronounced swelling at L ankle mild tenderness to palpation  Vascular: 2+ radial, DP/PT pulses B/L  Dermatologic: skin warm, dry and intact; no rashes, wounds, or scars  Neurologic: AAOx3; CNII-XII grossly intact; no focal deficits; mm strength 5/5 UE LE BL  Psychiatric: affect and characteristics of appearance, verbalizations, behaviors are appropriate    MEDICATIONS  (STANDING):  enoxaparin Injectable 72 milliGRAM(s) SubCutaneous every 24 hours    MEDICATIONS  (PRN):  albuterol/ipratropium for Nebulization 3 milliLiter(s) Nebulizer every 6 hours PRN Shortness of Breath and/or Wheezing    Allergies    No Known Allergies    Intolerances      LABS:                        11.4   6.29  )-----------( 212      ( 24 Oct 2019 06:24 )             36.6     10-24    136  |  101  |  15  ----------------------------<  261<H>  4.5   |  24  |  0.60    Ca    9.1      24 Oct 2019 06:24  Phos  4.3     10-24  Mg     2.2     10-24    TPro  7.6  /  Alb  4.0  /  TBili  0.2  /  DBili  x   /  AST  23  /  ALT  20  /  AlkPhos  111  10-23    PT/INR - ( 23 Oct 2019 19:07 )   PT: 12.1 sec;   INR: 1.07          PTT - ( 23 Oct 2019 19:07 )  PTT:28.8 sec    CAPILLARY BLOOD GLUCOSE          RADIOLOGY & ADDITIONAL TESTS: Reviewed.

## 2019-10-24 NOTE — DISCHARGE NOTE PROVIDER - NSDCFUADDAPPT_GEN_ALL_CORE_FT
Please follow up with your PCP at your regularly scheduled appointment on 11/5/2019. Please follow up with Marry ALMAZAN at [facility] for your appointment on 10/28/2019 for your DVT and eliquis.     Your insurance was given a 7 day supply of Eliquis 10mg BID. PLEASE MAKE ABOVE APPOINTMENT SO THAT YOU CAN FILL YOUR Eliquis 5mg BID thereafter. Please follow up with Marry ALMAZAN at [facility] for your appointment on 10/28/2019 for your DVT and eliquis.     ****Your insurance has accepted giving a 7 day supply of Eliquis 10mg BID. PLEASE MAKE ABOVE APPOINTMENT SO THAT YOU CAN FILL YOUR Eliquis 5mg BID thereafter due to insurance reasons. *****

## 2019-10-25 PROCEDURE — 99284 EMERGENCY DEPT VISIT MOD MDM: CPT

## 2019-10-28 ENCOUNTER — APPOINTMENT (OUTPATIENT)
Dept: INTERNAL MEDICINE | Facility: CLINIC | Age: 79
End: 2019-10-28
Payer: MEDICARE

## 2019-10-28 VITALS
WEIGHT: 104 LBS | SYSTOLIC BLOOD PRESSURE: 144 MMHG | HEIGHT: 56 IN | HEART RATE: 79 BPM | OXYGEN SATURATION: 99 % | DIASTOLIC BLOOD PRESSURE: 77 MMHG | BODY MASS INDEX: 23.39 KG/M2 | TEMPERATURE: 98.7 F

## 2019-10-28 DIAGNOSIS — Z74.09 OTHER REDUCED MOBILITY: ICD-10-CM

## 2019-10-28 DIAGNOSIS — R26.81 UNSTEADINESS ON FEET: ICD-10-CM

## 2019-10-28 PROCEDURE — 99496 TRANSJ CARE MGMT HIGH F2F 7D: CPT

## 2019-10-28 RX ORDER — FLUTICASONE PROPIONATE 50 UG/1
50 SPRAY, METERED NASAL
Qty: 1 | Refills: 1 | Status: COMPLETED | COMMUNITY
Start: 2019-06-19 | End: 2019-10-28

## 2019-10-28 NOTE — REVIEW OF SYSTEMS
[Recent Change In Weight] : ~T recent weight change [Unsteady Walking] : ataxia [Easy Bleeding] : easy bleeding [Negative] : Psychiatric [FreeTextEntry2] : 8lbs weight loss in one month.  [FreeTextEntry5] : LLE mild edema [de-identified] : dry skin [de-identified] : eliquis

## 2019-10-28 NOTE — ASSESSMENT
[FreeTextEntry1] : RTO in 2 weeks with resident\par BMP at next visit\par Fasting POCT FS next visit. Metformin ordered 10/28. \par Discuss Glucerna again at next visit.\par Monitor weight (last weight taken in room 3)\par F/u re: cane ordered 10/28. \par F/u re: M11Q completed for additional HHA hours. \par \par

## 2019-10-28 NOTE — COUNSELING
[Fall prevention counseling provided] : Fall prevention counseling provided [Use recommended devices] : Use recommended devices [FreeTextEntry1] : cane ordered today

## 2019-10-28 NOTE — PHYSICAL EXAM
[Well Developed] : well developed [Normal Voice/Communication] : normal voice/communication [Normal Sclera/Conjunctiva] : normal sclera/conjunctiva [No JVD] : no jugular venous distention [No Respiratory Distress] : no respiratory distress  [No Accessory Muscle Use] : no accessory muscle use [Non-distended] : non-distended [Normal Outer Ear/Nose] : the outer ears and nose were normal in appearance [Soft] : abdomen soft [Non Tender] : non-tender [Speech Grossly Normal] : speech grossly normal [Normal Affect] : the affect was normal [Normal Mood] : the mood was normal [Normal Insight/Judgement] : insight and judgment were intact [de-identified] : appears stated age.  [de-identified] : right chest single lumen mediport intact without erythema or swelling.  [de-identified] : LLE minimal edema. RLE no edema.  [de-identified] : healed lap incision sites intact to abd.  [de-identified] : unsteady gait

## 2019-10-28 NOTE — HISTORY OF PRESENT ILLNESS
[Post-hospitalization from ___ Hospital] : Post-hospitalization from [unfilled] Hospital [Patient Contacted By: ____] : and contacted by [unfilled] [Discharge Summary] : discharge summary [Pertinent Labs] : pertinent labs [Discharge Med List] : discharge medication list [Med Reconciliation] : medication reconciliation has been completed [Admitted on: ___] : The patient was admitted on [unfilled] [Discharged on ___] : discharged on [unfilled] [FreeTextEntry2] : [facility] 10/24/19 Pt is an 78 yo F with PMH asthma, pancreatic CA (s/p Whipple 5/2019 and on\par chemotherapy - Dr. Pruitt) who presents with LLE swelling. LLE swelling has\par has been chronic and intermittent since Sept of this year, was seen at onset\par with US LE neg DVT. Swelling has been acutely worse x1 wk with pain, erythema,\par and warmth; without numbness or weakness. Pt informed PCP who recommended\par elevation and ice packs without relief. Pt then went for regularly scheduled\par chemo appointment where she had a LE US pos DVT and was sent to ED. Pt denies\par F/C/night sweats/changes to vision/URI symptoms/CP/SOB/abd pain/bowel changes.\par Pt also with urinary frequency and dec PO x3-5d. In the ED, pt afebrile, HR 89,\par /79, saturating 99% on RA. Labs with glucose 243. US LLE pos DVT calf\par veins and superficial venous thrombosis of proximal and mid-greater saphenous\par veins. Pt received sq Lovenox 70mg x1 and was admitted to [facility] for further\par observation and management.

## 2019-10-31 RX ORDER — APIXABAN 2.5 MG/1
1 TABLET, FILM COATED ORAL
Qty: 60 | Refills: 0
Start: 2019-10-31 | End: 2019-11-29

## 2019-11-01 ENCOUNTER — APPOINTMENT (OUTPATIENT)
Dept: PULMONOLOGY | Facility: CLINIC | Age: 79
End: 2019-11-01
Payer: MEDICARE

## 2019-11-01 VITALS
WEIGHT: 103 LBS | HEART RATE: 96 BPM | SYSTOLIC BLOOD PRESSURE: 100 MMHG | TEMPERATURE: 98 F | OXYGEN SATURATION: 97 % | BODY MASS INDEX: 23.17 KG/M2 | HEIGHT: 56 IN | DIASTOLIC BLOOD PRESSURE: 70 MMHG

## 2019-11-01 PROBLEM — C25.9 MALIGNANT NEOPLASM OF PANCREAS, UNSPECIFIED: Chronic | Status: ACTIVE | Noted: 2019-10-23

## 2019-11-01 PROCEDURE — 99214 OFFICE O/P EST MOD 30 MIN: CPT

## 2019-11-01 NOTE — HISTORY OF PRESENT ILLNESS
[Difficulty Breathing During Exertion] : denies dyspnea on exertion [Feelings Of Weakness On Exertion] : denies exercise intolerance [Cough] : denies coughing [Wheezing] : denies wheezing [Regional Soft Tissue Swelling Both Lower Extremities] : denies lower extremity edema [Chest Pain Or Discomfort] : denies chest pain [Fever] : denies fever [0  -  Nothing at all] : 0, nothing at all [Class II - Mild Symptoms and Slight Limitations] : II [More Frequent Use Needed Recently] : Patient reports recent increase in frequency of [___ Times a Day] : [unfilled] time(s) a day [Never] : was never a smoker [None] : None [Adherent] : the patient is adherent with ~his/her~ medication regimen [Goals--Doing Well] : the patient is doing well with ~his/her~ goals [Wt Gain ___ Lbs] : no recent weight gain [Wt Loss ___ Lbs] : no recent weight loss [Oxygen] : the patient uses no supplemental oxygen [Good Control] : peak flow has been poor [Side Effects] : ~He/She~ denies medication side effects [FreeTextEntry1] : 79 year old female with PMH of Asthma, pancreatic CA s/p Whipple 5/2019 and starting chemo June 24th (chemo for 6 months) and starting on Xeloda (followed by Dr. Pruitt) and Pulmonary nodules.  She got a right sided chemo port placed on 6/17/2019.  She has home attendant M-W-F for 5 hours. \par \par She is tolerating the chemo will continue until November.  She is tolerating the chemo treatment.  She will then go for repeat CT scan at that time.  She is doing well. Appetite is good and maintaining her weight.\par \par She started to have bilateral leg swelling that started 2 weeks ago Dr. Pruitt sent her for doppler that was negative.  She has intermittent pain in the legs.   \par \par She denies any SOB she is able to walk and walk up stairs without SOB.  She denies any coughing, wheezing, or  fever.  She is using the Breo with occasionally using the Ventolin as needed approx. 1 time a day.  She is walking without SOB and  doing her ADL. Denies any ER visits.  Denies hemoptysis.  She denies any N/V, abdominal pain or blood in stool.\par \par 11/1/2019\par \par Pt was in ER after chemo due to LLE swelling she had a DVT found.  She was admitted and discharged on 10/24/2019.  She was started on Eliquis 5mg BID without any complications of bleeding. Her swelling in the leg has improved.  She now has DM and is currently taking metformin.  \par \par She denies any SOB and able to walk without SOB, coughing, fever or wheezing. She is currently using the breo 2 times a day.  she rarely requiring her LEILA.

## 2019-11-01 NOTE — ASSESSMENT
[FreeTextEntry1] : Pulmonary nodules\par \par Pt was originally referred to us for an abnormal CXR from her PCP Dr. Mercado.  She is now following PCP Dr. Burch. Follow up CT scan was performed October 9, 2018, with findings of multiple solid subcentimeter nodules largest 5 mm and signs of small and large airways disease with mild air trapping and tree and bud nodules. Incidental finding was 2 cystic structures, measuring 2 cm, in the pancreatic bed. \par \par Discussed pulmonary nodules could be due to scarring, inflammation or malignancy, but not limited too. Reviewed Fleischer society guidelines with pt and high risk due to smoking history. \par \par Plan \par \par - Follow up with repeat CT scan in one year. Pt given the script for the CT scan to go with any additional scan required by Dr. Pruitt.  \par \par Asthma\par \par Pt is currently stable and rarely requiring her LEILA. Continue on Breo 200 and LEILA as needed. ACT score 24/25.  She is to continue on current inhalers. Repeat PFT with 3 month follow up\par \par DVT\par - Continue on current therapy Eliquis BID \par \par Referred to pulmonary rehab

## 2019-11-05 ENCOUNTER — APPOINTMENT (OUTPATIENT)
Dept: INTERNAL MEDICINE | Facility: CLINIC | Age: 79
End: 2019-11-05
Payer: MEDICARE

## 2019-11-05 ENCOUNTER — MED ADMIN CHARGE (OUTPATIENT)
Age: 79
End: 2019-11-05

## 2019-11-05 VITALS
SYSTOLIC BLOOD PRESSURE: 116 MMHG | BODY MASS INDEX: 23.17 KG/M2 | OXYGEN SATURATION: 100 % | HEART RATE: 84 BPM | DIASTOLIC BLOOD PRESSURE: 64 MMHG | WEIGHT: 103 LBS | TEMPERATURE: 98.3 F | HEIGHT: 56 IN

## 2019-11-05 DIAGNOSIS — Z23 ENCOUNTER FOR IMMUNIZATION: ICD-10-CM

## 2019-11-05 PROCEDURE — 99214 OFFICE O/P EST MOD 30 MIN: CPT | Mod: GC,25

## 2019-11-05 PROCEDURE — 90733 MPSV4 VACCINE SUBQ: CPT | Mod: GY

## 2019-11-05 PROCEDURE — 90471 IMMUNIZATION ADMIN: CPT | Mod: GY

## 2019-12-26 ENCOUNTER — APPOINTMENT (OUTPATIENT)
Dept: INTERNAL MEDICINE | Facility: CLINIC | Age: 79
End: 2019-12-26
Payer: MEDICARE

## 2019-12-26 VITALS
BODY MASS INDEX: 22.27 KG/M2 | OXYGEN SATURATION: 97 % | DIASTOLIC BLOOD PRESSURE: 73 MMHG | WEIGHT: 99 LBS | HEART RATE: 85 BPM | SYSTOLIC BLOOD PRESSURE: 122 MMHG | TEMPERATURE: 98.4 F | HEIGHT: 56 IN

## 2019-12-26 DIAGNOSIS — I82.401 ACUTE EMBOLISM AND THROMBOSIS OF UNSPECIFIED DEEP VEINS OF RIGHT LOWER EXTREMITY: ICD-10-CM

## 2019-12-26 PROBLEM — C25.9 MALIGNANT NEOPLASM OF PANCREAS, UNSPECIFIED: Chronic | Status: ACTIVE | Noted: 2019-11-13

## 2019-12-26 PROCEDURE — 99214 OFFICE O/P EST MOD 30 MIN: CPT

## 2020-01-01 ENCOUNTER — APPOINTMENT (OUTPATIENT)
Dept: INTERNAL MEDICINE | Facility: CLINIC | Age: 80
End: 2020-01-01

## 2020-01-01 ENCOUNTER — APPOINTMENT (OUTPATIENT)
Dept: PULMONOLOGY | Facility: CLINIC | Age: 80
End: 2020-01-01
Payer: MEDICARE

## 2020-01-01 ENCOUNTER — APPOINTMENT (OUTPATIENT)
Dept: RADIOLOGY | Facility: CLINIC | Age: 80
End: 2020-01-01
Payer: MEDICARE

## 2020-01-01 ENCOUNTER — APPOINTMENT (OUTPATIENT)
Dept: CT IMAGING | Facility: CLINIC | Age: 80
End: 2020-01-01
Payer: MEDICARE

## 2020-01-01 ENCOUNTER — APPOINTMENT (OUTPATIENT)
Dept: INTERNAL MEDICINE | Facility: CLINIC | Age: 80
End: 2020-01-01
Payer: MEDICARE

## 2020-01-01 ENCOUNTER — NON-APPOINTMENT (OUTPATIENT)
Age: 80
End: 2020-01-01

## 2020-01-01 ENCOUNTER — OUTPATIENT (OUTPATIENT)
Dept: OUTPATIENT SERVICES | Facility: HOSPITAL | Age: 80
LOS: 1 days | End: 2020-01-01

## 2020-01-01 ENCOUNTER — EMERGENCY (EMERGENCY)
Facility: HOSPITAL | Age: 80
LOS: 1 days | Discharge: HOME | End: 2020-01-01
Admitting: EMERGENCY MEDICINE
Payer: MEDICARE

## 2020-01-01 ENCOUNTER — APPOINTMENT (OUTPATIENT)
Dept: CT IMAGING | Facility: HOSPITAL | Age: 80
End: 2020-01-01

## 2020-01-01 ENCOUNTER — MED ADMIN CHARGE (OUTPATIENT)
Age: 80
End: 2020-01-01

## 2020-01-01 ENCOUNTER — APPOINTMENT (OUTPATIENT)
Dept: INTERVENTIONAL RADIOLOGY/VASCULAR | Facility: HOSPITAL | Age: 80
End: 2020-01-01

## 2020-01-01 VITALS
OXYGEN SATURATION: 95 % | RESPIRATION RATE: 17 BRPM | DIASTOLIC BLOOD PRESSURE: 83 MMHG | SYSTOLIC BLOOD PRESSURE: 144 MMHG | HEART RATE: 89 BPM | TEMPERATURE: 98 F | HEIGHT: 55 IN

## 2020-01-01 VITALS
HEART RATE: 102 BPM | WEIGHT: 91 LBS | HEIGHT: 56 IN | DIASTOLIC BLOOD PRESSURE: 75 MMHG | BODY MASS INDEX: 20.47 KG/M2 | TEMPERATURE: 97.7 F | OXYGEN SATURATION: 96 % | SYSTOLIC BLOOD PRESSURE: 148 MMHG

## 2020-01-01 VITALS
OXYGEN SATURATION: 99 % | DIASTOLIC BLOOD PRESSURE: 75 MMHG | BODY MASS INDEX: 21.3 KG/M2 | WEIGHT: 95 LBS | HEART RATE: 82 BPM | TEMPERATURE: 98.2 F | SYSTOLIC BLOOD PRESSURE: 122 MMHG

## 2020-01-01 VITALS
BODY MASS INDEX: 21.65 KG/M2 | HEART RATE: 113 BPM | WEIGHT: 96.25 LBS | OXYGEN SATURATION: 96 % | TEMPERATURE: 98.7 F | SYSTOLIC BLOOD PRESSURE: 100 MMHG | HEIGHT: 56 IN | DIASTOLIC BLOOD PRESSURE: 60 MMHG

## 2020-01-01 VITALS
HEIGHT: 56 IN | BODY MASS INDEX: 21.15 KG/M2 | HEART RATE: 99 BPM | OXYGEN SATURATION: 97 % | DIASTOLIC BLOOD PRESSURE: 82 MMHG | WEIGHT: 94 LBS | TEMPERATURE: 97.8 F | SYSTOLIC BLOOD PRESSURE: 120 MMHG

## 2020-01-01 VITALS
DIASTOLIC BLOOD PRESSURE: 67 MMHG | WEIGHT: 97 LBS | TEMPERATURE: 98.1 F | HEIGHT: 56 IN | HEART RATE: 89 BPM | BODY MASS INDEX: 21.82 KG/M2 | OXYGEN SATURATION: 98 % | SYSTOLIC BLOOD PRESSURE: 122 MMHG

## 2020-01-01 VITALS
HEART RATE: 83 BPM | HEIGHT: 56 IN | WEIGHT: 97 LBS | SYSTOLIC BLOOD PRESSURE: 120 MMHG | BODY MASS INDEX: 21.82 KG/M2 | DIASTOLIC BLOOD PRESSURE: 71 MMHG | OXYGEN SATURATION: 97 % | TEMPERATURE: 98.3 F

## 2020-01-01 DIAGNOSIS — Z86.718 PERSONAL HISTORY OF OTHER VENOUS THROMBOSIS AND EMBOLISM: ICD-10-CM

## 2020-01-01 DIAGNOSIS — Z20.828 CONTACT WITH AND (SUSPECTED) EXPOSURE TO OTHER VIRAL COMMUNICABLE DISEASES: ICD-10-CM

## 2020-01-01 DIAGNOSIS — Z90.49 ACQUIRED ABSENCE OF OTHER SPECIFIED PARTS OF DIGESTIVE TRACT: Chronic | ICD-10-CM

## 2020-01-01 DIAGNOSIS — R63.4 ABNORMAL WEIGHT LOSS: ICD-10-CM

## 2020-01-01 DIAGNOSIS — Z87.81 PERSONAL HISTORY OF (HEALED) TRAUMATIC FRACTURE: ICD-10-CM

## 2020-01-01 DIAGNOSIS — Z98.890 OTHER SPECIFIED POSTPROCEDURAL STATES: Chronic | ICD-10-CM

## 2020-01-01 DIAGNOSIS — Z23 ENCOUNTER FOR IMMUNIZATION: ICD-10-CM

## 2020-01-01 DIAGNOSIS — Z13.820 ENCOUNTER FOR SCREENING FOR OSTEOPOROSIS: ICD-10-CM

## 2020-01-01 DIAGNOSIS — M81.0 AGE-RELATED OSTEOPOROSIS W/OUT CURRENT PATHOLOGICAL FRACTURE: ICD-10-CM

## 2020-01-01 LAB
ALBUMIN SERPL ELPH-MCNC: 3.5 G/DL
ALP BLD-CCNC: 82 U/L
ALT SERPL-CCNC: 9 U/L
ANION GAP SERPL CALC-SCNC: 14 MMOL/L
AST SERPL-CCNC: 13 U/L
BILIRUB SERPL-MCNC: <0.2 MG/DL
BUN SERPL-MCNC: 8 MG/DL
CALCIUM SERPL-MCNC: 9.2 MG/DL
CHLORIDE SERPL-SCNC: 99 MMOL/L
CO2 SERPL-SCNC: 25 MMOL/L
CREAT SERPL-MCNC: 0.6 MG/DL
GLUCOSE SERPL-MCNC: 91 MG/DL
POTASSIUM SERPL-SCNC: 4.2 MMOL/L
PROT SERPL-MCNC: 6.6 G/DL
SARS-COV-2 RNA SPEC QL NAA+PROBE: SIGNIFICANT CHANGE UP
SODIUM SERPL-SCNC: 138 MMOL/L

## 2020-01-01 PROCEDURE — G2012 BRIEF CHECK IN BY MD/QHP: CPT

## 2020-01-01 PROCEDURE — 99214 OFFICE O/P EST MOD 30 MIN: CPT | Mod: GC

## 2020-01-01 PROCEDURE — G0009: CPT

## 2020-01-01 PROCEDURE — 36415 COLL VENOUS BLD VENIPUNCTURE: CPT

## 2020-01-01 PROCEDURE — 99214 OFFICE O/P EST MOD 30 MIN: CPT

## 2020-01-01 PROCEDURE — 99072 ADDL SUPL MATRL&STAF TM PHE: CPT

## 2020-01-01 PROCEDURE — 77080 DXA BONE DENSITY AXIAL: CPT | Mod: 26

## 2020-01-01 PROCEDURE — 99213 OFFICE O/P EST LOW 20 MIN: CPT | Mod: GC

## 2020-01-01 PROCEDURE — 99283 EMERGENCY DEPT VISIT LOW MDM: CPT

## 2020-01-01 PROCEDURE — 90670 PCV13 VACCINE IM: CPT

## 2020-01-01 PROCEDURE — 71270 CT THORAX DX C-/C+: CPT | Mod: 26

## 2020-01-01 PROCEDURE — 99283 EMERGENCY DEPT VISIT LOW MDM: CPT | Mod: CS

## 2020-01-01 PROCEDURE — 99214 OFFICE O/P EST MOD 30 MIN: CPT | Mod: 25

## 2020-01-01 PROCEDURE — 74178 CT ABD&PLV WO CNTR FLWD CNTR: CPT | Mod: 26

## 2020-01-01 PROCEDURE — U0003: CPT

## 2020-01-01 RX ORDER — APIXABAN 5 MG/1
5 TABLET, FILM COATED ORAL
Qty: 60 | Refills: 2 | Status: DISCONTINUED | COMMUNITY
Start: 1900-01-01 | End: 2020-01-01

## 2020-01-01 RX ORDER — PROCHLORPERAZINE MALEATE 10 MG/1
10 TABLET ORAL
Refills: 0 | Status: DISCONTINUED | COMMUNITY
Start: 2019-08-02 | End: 2020-01-01

## 2020-01-01 RX ORDER — FLUOCINONIDE 0.5 MG/G
0.05 CREAM TOPICAL 3 TIMES DAILY
Qty: 90 | Refills: 1 | Status: DISCONTINUED | COMMUNITY
Start: 2019-08-02 | End: 2020-01-01

## 2020-01-01 RX ORDER — FLUTICASONE FUROATE AND VILANTEROL TRIFENATATE 200; 25 UG/1; UG/1
200-25 POWDER RESPIRATORY (INHALATION) DAILY
Qty: 1 | Refills: 11 | Status: ACTIVE | COMMUNITY
Start: 2019-03-26 | End: 1900-01-01

## 2020-01-01 RX ORDER — CAPECITABINE 500 MG/1
500 TABLET ORAL TWICE DAILY
Refills: 0 | Status: DISCONTINUED | COMMUNITY
Start: 2019-08-02 | End: 2020-01-01

## 2020-01-01 RX ORDER — CHOLECALCIFEROL (VITAMIN D3) 10(400)/ML
10 DROPS ORAL
Refills: 0 | Status: ACTIVE | COMMUNITY
Start: 2020-01-01

## 2020-01-07 ENCOUNTER — MED ADMIN CHARGE (OUTPATIENT)
Age: 80
End: 2020-01-07

## 2020-01-07 ENCOUNTER — APPOINTMENT (OUTPATIENT)
Dept: INTERNAL MEDICINE | Facility: CLINIC | Age: 80
End: 2020-01-07
Payer: MEDICARE

## 2020-01-07 PROCEDURE — 90471 IMMUNIZATION ADMIN: CPT

## 2020-01-07 PROCEDURE — 90734 MENACWYD/MENACWYCRM VACC IM: CPT | Mod: GY

## 2020-01-24 NOTE — PATIENT PROFILE ADULT - LIVES WITH
DATE OF OPERATION:  01/24/20 - [facility]

 

DATE OF BIRTH:  [birth year]

 

ATTENDING SURGEON:  Jonathan Cryer, MD

 

ASSISTANT:  None.

 

ANESTHESIA:  General.

 

PRE-OP DIAGNOSIS:  Chronic otitis media.

 

POST-OP DIAGNOSIS:  Chronic otitis media.

 

OPERATIVE PROCEDURE:  Bilateral myringotomy with tube placement.

 

FINDINGS:  Mucoid effusions in both middle ear spaces.

 

DESCRIPTION OF PROCEDURE:  This is a 1-1/2-year-old boy who presents for 
placement of bilateral myringotomy tubes.  He was brought to the operating room
, general anesthesia was induced with a mask.  A time-out was performed.  The 
left ear was addressed first.  Cerumen was cleaned out of the ear canal under 
the microscope. An inferior radial myringotomy was then made.  Mucoid fluid was 
suctioned out of the middle ear space and an Su beveled grommet tube was 
placed followed by Floxin drops and a cotton ball.  The head was then turned, 
the procedure was repeated in an identical fashion in the right ear.  Again 
mucoid fluid was encountered and an Su beveled grommet tube was placed 
followed by Floxin drops.  Child was returned to the PACU in stable condition.

 

 [id number]/CPS #: [id number]

MTDD sibling(s)

## 2020-02-06 ENCOUNTER — APPOINTMENT (OUTPATIENT)
Dept: PULMONOLOGY | Facility: CLINIC | Age: 80
End: 2020-02-06
Payer: MEDICARE

## 2020-02-06 VITALS
BODY MASS INDEX: 22.27 KG/M2 | TEMPERATURE: 98.3 F | OXYGEN SATURATION: 97 % | SYSTOLIC BLOOD PRESSURE: 110 MMHG | HEART RATE: 95 BPM | DIASTOLIC BLOOD PRESSURE: 70 MMHG | WEIGHT: 99 LBS | HEIGHT: 56 IN | RESPIRATION RATE: 12 BRPM

## 2020-02-06 PROCEDURE — 99214 OFFICE O/P EST MOD 30 MIN: CPT

## 2020-02-06 NOTE — PHYSICAL EXAM
[General Appearance - Well Developed] : well developed [Normal Appearance] : normal appearance [Well Groomed] : well groomed [General Appearance - Well Nourished] : well nourished [General Appearance - In No Acute Distress] : no acute distress [No Deformities] : no deformities [Eyelids - No Xanthelasma] : the eyelids demonstrated no xanthelasmas [Normal Conjunctiva] : the conjunctiva exhibited no abnormalities [Neck Appearance] : the appearance of the neck was normal [Neck Cervical Mass (___cm)] : no neck mass was observed [Normal Oropharynx] : normal oropharynx [Thyroid Nodule] : there were no palpable thyroid nodules [Thyroid Diffuse Enlargement] : the thyroid was not enlarged [Jugular Venous Distention Increased] : there was no jugular-venous distention [Heart Rate And Rhythm] : heart rate and rhythm were normal [Heart Sounds] : normal S1 and S2 [Murmurs] : no murmurs present [Respiration, Rhythm And Depth] : normal respiratory rhythm and effort [Exaggerated Use Of Accessory Muscles For Inspiration] : no accessory muscle use [Nail Clubbing] : no clubbing of the fingernails [Auscultation Breath Sounds / Voice Sounds] : lungs were clear to auscultation bilaterally [Cyanosis, Localized] : no localized cyanosis [Petechial Hemorrhages (___cm)] : no petechial hemorrhages [] : no ischemic changes

## 2020-02-06 NOTE — ASSESSMENT
[FreeTextEntry1] : Pulmonary nodules\par \par Pt was originally referred to us for an abnormal CXR from her PCP Dr. Mercado.  She is now following PCP Dr. Burch. Follow up CT scan was performed October 9, 2018, with findings of multiple solid subcentimeter nodules largest 5 mm and signs of small and large airways disease with mild air trapping and tree and bud nodules. Incidental finding was 2 cystic structures, measuring 2 cm, in the pancreatic bed. \par \par Discussed pulmonary nodules could be due to scarring, inflammation or malignancy, but not limited too. Reviewed Fleischer society guidelines with pt and high risk due to smoking history. \par \par Plan \par \par - patient had CT chest done at [facility], will need to get CD-ROM to compare pulmonary nodules. \par \par Asthma\par \par Pt is currently stable and rarely requiring her LEILA. Continue on Breo 200 and LEILA as needed.\par - patient has been using breo BID, advised to use once a day. \par \par DVT\par - Continue on current therapy Eliquis BID \par \par The patient will be s/e/d with Dr Magaña.

## 2020-02-06 NOTE — HISTORY OF PRESENT ILLNESS
[Difficulty Breathing During Exertion] : denies dyspnea on exertion [Wheezing] : denies wheezing [Cough] : denies coughing [Feelings Of Weakness On Exertion] : denies exercise intolerance [Chest Pain Or Discomfort] : denies chest pain [Regional Soft Tissue Swelling Both Lower Extremities] : denies lower extremity edema [Fever] : denies fever [0  -  Nothing at all] : 0, nothing at all [Class II - Mild Symptoms and Slight Limitations] : II [More Frequent Use Needed Recently] : Patient reports recent increase in frequency of [___ Times a Day] : [unfilled] time(s) a day [Never] : was never a smoker [Adherent] : the patient is adherent with ~his/her~ medication regimen [None] : None [Goals--Doing Well] : the patient is doing well with ~his/her~ goals [Wt Gain ___ Lbs] : no recent weight gain [Wt Loss ___ Lbs] : no recent weight loss [Oxygen] : the patient uses no supplemental oxygen [Good Control] : peak flow has been poor [Side Effects] : ~He/She~ denies medication side effects [FreeTextEntry1] : 79 year old female with PMH of Asthma, pancreatic CA s/p Whipple 5/2019 and starting chemo June 24th (chemo for 6 months) and starting on Xeloda (followed by Dr. Pruitt) and Pulmonary nodules.  She got a right sided chemo port placed on 6/17/2019.  She has home attendant M-W-F for 5 hours. \par \par She is tolerating the chemo will continue until November.  She is tolerating the chemo treatment.  She will then go for repeat CT scan at that time.  She is doing well. Appetite is good and maintaining her weight.\par \par She started to have bilateral leg swelling that started 2 weeks ago Dr. Pruitt sent her for doppler that was negative.  She has intermittent pain in the legs.   \par \par She denies any SOB she is able to walk and walk up stairs without SOB.  She denies any coughing, wheezing, or  fever.  She is using the Breo with occasionally using the Ventolin as needed approx. 1 time a day.  She is walking without SOB and  doing her ADL. Denies any ER visits.  Denies hemoptysis.  She denies any N/V, abdominal pain or blood in stool.\par \par 11/1/2019\par \par Pt was in ER after chemo due to LLE swelling she had a DVT found.  She was admitted and discharged on 10/24/2019.  She was started on Eliquis 5mg BID without any complications of bleeding. Her swelling in the leg has improved.  She now has DM and is currently taking metformin.  \par \par She denies any SOB and able to walk without SOB, coughing, fever or wheezing. She is currently using the breo 2 times a day.  she rarely requiring her LEILA.  \par \par 2/6/2020: \par patient reports to be doing well and denies any cough/fever/chills/dyspnea. She has been used albuterol 2x last week. using breo regularly.

## 2020-02-18 ENCOUNTER — APPOINTMENT (OUTPATIENT)
Dept: INTERNAL MEDICINE | Facility: CLINIC | Age: 80
End: 2020-02-18
Payer: MEDICARE

## 2020-02-18 VITALS
TEMPERATURE: 98.2 F | OXYGEN SATURATION: 97 % | DIASTOLIC BLOOD PRESSURE: 72 MMHG | WEIGHT: 100 LBS | HEART RATE: 64 BPM | BODY MASS INDEX: 22.42 KG/M2 | SYSTOLIC BLOOD PRESSURE: 121 MMHG

## 2020-02-18 DIAGNOSIS — Z90.81 ACQUIRED ABSENCE OF SPLEEN: ICD-10-CM

## 2020-02-18 DIAGNOSIS — R63.8 OTHER SYMPTOMS AND SIGNS CONCERNING FOOD AND FLUID INTAKE: ICD-10-CM

## 2020-02-18 DIAGNOSIS — D49.0 NEOPLASM OF UNSPECIFIED BEHAVIOR OF DIGESTIVE SYSTEM: ICD-10-CM

## 2020-02-18 PROCEDURE — 36415 COLL VENOUS BLD VENIPUNCTURE: CPT

## 2020-02-18 PROCEDURE — 99214 OFFICE O/P EST MOD 30 MIN: CPT | Mod: GC,25

## 2020-02-24 LAB
25(OH)D3 SERPL-MCNC: 25.2 NG/ML
ALBUMIN SERPL ELPH-MCNC: 4.3 G/DL
ALP BLD-CCNC: 70 U/L
ALT SERPL-CCNC: 11 U/L
ANION GAP SERPL CALC-SCNC: 14 MMOL/L
AST SERPL-CCNC: 20 U/L
BILIRUB SERPL-MCNC: 0.2 MG/DL
BUN SERPL-MCNC: 12 MG/DL
CALCIUM SERPL-MCNC: 9.2 MG/DL
CHLORIDE SERPL-SCNC: 103 MMOL/L
CO2 SERPL-SCNC: 25 MMOL/L
CREAT SERPL-MCNC: 0.6 MG/DL
CREAT SPEC-SCNC: 24 MG/DL
ESTIMATED AVERAGE GLUCOSE: 131 MG/DL
FOLATE SERPL-MCNC: 10.3 NG/ML
GLUCOSE SERPL-MCNC: 78 MG/DL
HBA1C MFR BLD HPLC: 6.2 %
IRON SATN MFR SERPL: 22 %
IRON SERPL-MCNC: 85 UG/DL
MICROALBUMIN 24H UR DL<=1MG/L-MCNC: <1.2 MG/DL
MICROALBUMIN/CREAT 24H UR-RTO: NORMAL MG/G
POTASSIUM SERPL-SCNC: 4.2 MMOL/L
PROT SERPL-MCNC: 6.7 G/DL
SODIUM SERPL-SCNC: 142 MMOL/L
TIBC SERPL-MCNC: 394 UG/DL
UIBC SERPL-MCNC: 309 UG/DL
ZINC SERPL-MCNC: 91 UG/DL

## 2020-05-18 PROBLEM — R63.4 WEIGHT LOSS: Status: ACTIVE | Noted: 2019-10-28

## 2020-07-02 NOTE — ASSESSMENT
[FreeTextEntry1] : Pulmonary nodules\par \par Pt was originally referred to us for an abnormal CXR from her PCP Dr. Mercado.  She is now following PCP Dr. Burch. Follow up CT scan from March 12,2020 stable  there is sub centimeter nodule and stable 0.6 cm nodule.   Will follow with repeat CT scan in September with Dr. Pruitt. \par \par Discussed pulmonary nodules could be due to scarring, inflammation or malignancy, but not limited too. Reviewed Fleischer society guidelines with pt and high risk due to smoking history. \par \par Asthma\par \par Pt is currently stable and rarely requiring her LEILA. Continue on Breo 200 and LEILA as needed.\par \par DVT\par \par Off eliquis due to extensive nose bleeds that required hospital visit.  No signs of reoccurance \par \par F/U in September after CT scan

## 2020-07-02 NOTE — HISTORY OF PRESENT ILLNESS
[Difficulty Breathing During Exertion] : denies dyspnea on exertion [Feelings Of Weakness On Exertion] : denies exercise intolerance [Regional Soft Tissue Swelling Both Lower Extremities] : denies lower extremity edema [Cough] : denies coughing [Wheezing] : denies wheezing [Fever] : denies fever [Chest Pain Or Discomfort] : denies chest pain [0  -  Nothing at all] : 0, nothing at all [Class II - Mild Symptoms and Slight Limitations] : II [More Frequent Use Needed Recently] : Patient reports recent increase in frequency of [___ Times a Day] : [unfilled] time(s) a day [None] : None [Adherent] : the patient is adherent with ~his/her~ medication regimen [Goals--Doing Well] : the patient is doing well with ~his/her~ goals [Stable] : are stable [TextBox_4] : 79 year old female with PMH of Asthma, pancreatic CA s/p Whipple 5/2019 and starting chemo June 24th (chemo for 6 months) and starting on Xeloda (followed by Dr. Pruitt) and Pulmonary nodules.  She got a right sided chemo port placed on 6/17/2019.  She has home attendant M-W-F for 5 hours. \par \par She is tolerating the chemo will continue until November.  She is tolerating the chemo treatment.  She will then go for repeat CT scan at that time.  She is doing well. Appetite is good and maintaining her weight.\par \par She started to have bilateral leg swelling that started 2 weeks ago Dr. Pruitt sent her for doppler that was negative.  She has intermittent pain in the legs.   \par \par She denies any SOB she is able to walk and walk up stairs without SOB.  She denies any coughing, wheezing, or  fever.  She is using the Breo with occasionally using the Ventolin as needed approx. 1 time a day.  She is walking without SOB and  doing her ADL. Denies any ER visits.  Denies hemoptysis.  She denies any N/V, abdominal pain or blood in stool.\par \par 11/1/2019\par \par Pt was in ER after chemo due to LLE swelling she had a DVT found.  She was admitted and discharged on 10/24/2019.  She was started on Eliquis 5mg BID without any complications of bleeding. Her swelling in the leg has improved.  She now has DM and is currently taking metformin.  \par \par She denies any SOB and able to walk without SOB, coughing, fever or wheezing. She is currently using the breo 2 times a day.  she rarely requiring her LEILA.  \par \par 2/6/2020: \par patient reports to be doing well and denies any cough/fever/chills/dyspnea. She has been used albuterol 2x last week. using breo regularly.\par \par 7/2/2020\par \par Pt present today for follow up.  Last CT scan was in March getting the report from Dr. Pruitt no change and next CT is scheduled for September with oncology.\par \par She is compliant with Breo daily and using the Ventolin rarely.  She is off eliquis due to nose bleeds requiring ER visits.  She walks 10 blocks without SOB.  She has been safe with COVID. [Wt Gain ___ Lbs] : no recent weight gain [Oxygen] : the patient uses no supplemental oxygen [Wt Loss ___ Lbs] : no recent weight loss [Good Control] : peak flow has been poor [Side Effects] : ~He/She~ denies medication side effects

## 2020-07-02 NOTE — PHYSICAL EXAM
[General Appearance - Well Developed] : well developed [Normal Appearance] : normal appearance [Well Groomed] : well groomed [General Appearance - Well Nourished] : well nourished [General Appearance - In No Acute Distress] : no acute distress [No Deformities] : no deformities [Normal Conjunctiva] : the conjunctiva exhibited no abnormalities [Eyelids - No Xanthelasma] : the eyelids demonstrated no xanthelasmas [Normal Oropharynx] : normal oropharynx [Neck Appearance] : the appearance of the neck was normal [Jugular Venous Distention Increased] : there was no jugular-venous distention [Neck Cervical Mass (___cm)] : no neck mass was observed [Thyroid Diffuse Enlargement] : the thyroid was not enlarged [Thyroid Nodule] : there were no palpable thyroid nodules [Heart Rate And Rhythm] : heart rate and rhythm were normal [Murmurs] : no murmurs present [Heart Sounds] : normal S1 and S2 [Respiration, Rhythm And Depth] : normal respiratory rhythm and effort [Exaggerated Use Of Accessory Muscles For Inspiration] : no accessory muscle use [Auscultation Breath Sounds / Voice Sounds] : lungs were clear to auscultation bilaterally [Nail Clubbing] : no clubbing of the fingernails [Petechial Hemorrhages (___cm)] : no petechial hemorrhages [Cyanosis, Localized] : no localized cyanosis [] : no ischemic changes

## 2020-08-24 PROBLEM — Z13.820 SCREENING FOR OSTEOPOROSIS: Status: ACTIVE | Noted: 2020-01-01

## 2020-08-26 PROBLEM — Z87.81 HISTORY OF FRACTURE: Status: ACTIVE | Noted: 2019-03-14

## 2020-09-10 NOTE — HISTORY OF PRESENT ILLNESS
[Stable] : are stable [Difficulty Breathing During Exertion] : denies dyspnea on exertion [Feelings Of Weakness On Exertion] : denies exercise intolerance [Cough] : denies coughing [Wheezing] : denies wheezing [Regional Soft Tissue Swelling Both Lower Extremities] : denies lower extremity edema [Chest Pain Or Discomfort] : denies chest pain [Fever] : denies fever [0  -  Nothing at all] : 0, nothing at all [Class II - Mild Symptoms and Slight Limitations] : II [More Frequent Use Needed Recently] : Patient reports recent increase in frequency of [___ Times a Day] : [unfilled] time(s) a day [None] : None [Adherent] : the patient is adherent with ~his/her~ medication regimen [Goals--Doing Well] : the patient is doing well with ~his/her~ goals [TextBox_4] : 79 year old female with PMH of Asthma, pancreatic CA s/p Whipple 5/2019 and starting chemo June 24th (chemo for 6 months) and starting on Xeloda (followed by Dr. Pruitt) and Pulmonary nodules.  She got a right sided chemo port placed on 6/17/2019.  She has home attendant M-W-F for 5 hours. \par \par She is tolerating the chemo will continue until November.  She is tolerating the chemo treatment.  She will then go for repeat CT scan at that time.  She is doing well. Appetite is good and maintaining her weight.\par \par She started to have bilateral leg swelling that started 2 weeks ago Dr. Pruitt sent her for doppler that was negative.  She has intermittent pain in the legs.   \par \par She denies any SOB she is able to walk and walk up stairs without SOB.  She denies any coughing, wheezing, or  fever.  She is using the Breo with occasionally using the Ventolin as needed approx. 1 time a day.  She is walking without SOB and  doing her ADL. Denies any ER visits.  Denies hemoptysis.  She denies any N/V, abdominal pain or blood in stool.\par \par 11/1/2019\par \par Pt was in ER after chemo due to LLE swelling she had a DVT found.  She was admitted and discharged on 10/24/2019.  She was started on Eliquis 5mg BID without any complications of bleeding. Her swelling in the leg has improved.  She now has DM and is currently taking metformin.  \par \par She denies any SOB and able to walk without SOB, coughing, fever or wheezing. She is currently using the breo 2 times a day.  she rarely requiring her LEILA.  \par \par 2/6/2020: \par patient reports to be doing well and denies any cough/fever/chills/dyspnea. She has been used albuterol 2x last week. using breo regularly.\par \par 7/2/2020\par \par Pt present today for follow up.  Last CT scan was in March getting the report from Dr. Pruitt no change and next CT is scheduled for September with oncology.\par \par She is compliant with Breo daily and using the Ventolin rarely.  She is off eliquis due to nose bleeds requiring ER visits.  She walks 10 blocks without SOB.  She has been safe with COVID.\par \par 9/10/2020\par \par Comes in today for follow-up of asthma and to discuss CT scan which showed stability of subcentimeter pulmonary nodules. Also says pharmacy told her that her albuterol HFA LEILA is no longer covered by insurance. \par \par She is compliant w/ Breo - says she has actually been using twice a day. Always rinses after. Rarely needs LEILA -- average twice a week at most. No nosebleeds. Has annual physical with PMD tomorrow and plans to get bone density done for them.  [Wt Gain ___ Lbs] : no recent weight gain [Wt Loss ___ Lbs] : no recent weight loss [Oxygen] : the patient uses no supplemental oxygen [Good Control] : peak flow has been poor [Side Effects] : ~He/She~ denies medication side effects

## 2020-09-10 NOTE — PHYSICAL EXAM
[General Appearance - Well Developed] : well developed [Well Groomed] : well groomed [Normal Appearance] : normal appearance [General Appearance - Well Nourished] : well nourished [No Deformities] : no deformities [General Appearance - In No Acute Distress] : no acute distress [Normal Conjunctiva] : the conjunctiva exhibited no abnormalities [Eyelids - No Xanthelasma] : the eyelids demonstrated no xanthelasmas [Normal Oropharynx] : normal oropharynx [Neck Appearance] : the appearance of the neck was normal [Neck Cervical Mass (___cm)] : no neck mass was observed [Jugular Venous Distention Increased] : there was no jugular-venous distention [Thyroid Diffuse Enlargement] : the thyroid was not enlarged [Thyroid Nodule] : there were no palpable thyroid nodules [Heart Sounds] : normal S1 and S2 [Heart Rate And Rhythm] : heart rate and rhythm were normal [Murmurs] : no murmurs present [Respiration, Rhythm And Depth] : normal respiratory rhythm and effort [Auscultation Breath Sounds / Voice Sounds] : lungs were clear to auscultation bilaterally [Exaggerated Use Of Accessory Muscles For Inspiration] : no accessory muscle use [Nail Clubbing] : no clubbing of the fingernails [Cyanosis, Localized] : no localized cyanosis [Petechial Hemorrhages (___cm)] : no petechial hemorrhages [] : no ischemic changes

## 2020-09-10 NOTE — ASSESSMENT
[FreeTextEntry1] : Pulmonary nodules\par \par Pt was originally referred to us for an abnormal CXR from her PCP Dr. Mercado.  She is now following PCP Dr. Burch. Follow up CT scan from March 12,2020 stable  there is sub centimeter nodule and stable 0.6 cm nodule. Repeat CT in August 2020 was also stable/unchanged. \par \par Discussed pulmonary nodules could be due to scarring, inflammation or malignancy, among other conditions. Again reviewed [employer] guidelines with pt, and high risk due to smoking history. She actually would prefer to continue q6mo surveillance imaging as opposed to 1year follow-up and she discussed this with Dr. Becerra as she hopes that if the next CT is stable, she can have her right chemoport removed. \par \par Asthma\par \par Pt is currently stable and rarely requiring her LEILA. Continue on Breo 200 and LEILA as needed. \par I reminded the patient she only needs to use the Breo once a day and to always rinse mouth afterward.\par \par I called her pharmacy to clarify what the issue was with her LEILA -- turns out that brand name Ventolin is what is covered and the generic albuterol substitute is no longer covered. Sent new Rx for Ventolin as JEN as instructed by pharmacist.\par \par DVT\par \par Remains off eliquis due to extensive nose bleeds that required hospital visit. No signs of reoccurrence, continue to monitor\par \par f/u in February/March after next CT or sooner if issues.

## 2020-09-15 PROBLEM — M81.0 OSTEOPOROSIS: Status: ACTIVE | Noted: 2020-01-01

## 2020-09-25 PROBLEM — Z23 NEED FOR PNEUMOCOCCAL VACCINATION: Status: ACTIVE | Noted: 2020-01-01

## 2020-12-08 NOTE — ED PROVIDER NOTE - PATIENT PORTAL LINK FT
You can access the FollowMyHealth Patient Portal offered by [facility] by registering at the following website: [email or web address]. By joining [facility]’s FollowMyHealth portal, you will also be able to view your health information using other applications (apps) compatible with our system.

## 2020-12-10 PROBLEM — Z86.718 HISTORY OF DEEP VENOUS THROMBOSIS: Status: RESOLVED | Noted: 2019-11-01 | Resolved: 2020-01-01

## 2020-12-10 NOTE — ASSESSMENT
[FreeTextEntry1] : 79yo woman w/ asthma, pancreatic CA s/p Whipple 5/2019, h/o DVT 2019, and multiple stable subcentimeter pulmonary nodules coming in for f/u of asthma and nodules.\par \par #Pulmonary nodules\par \par Pt was originally referred to us for an abnormal CXR from her PCP Dr. Mercado.  She is now following PCP Dr. Burch. Her 6mo chest CTs were stable in March and August. Her repeat CT C/A/P w/ IVC revealed what could be recurrence of malignancy with metastases possibly of ovarian vs. recurrent pancreatic CA. Her pulmonary nodules appear to be stable with few new micronodules. The possibility of malignant etiology of nodules exists given her abdominal findings though the size stability of these nodules is reassuring. She is establishing with [facility] for second oncology opinion and we agree she should be evaluated and can follow up their recommendations. \par \par Asthma\par \par Pt is currently stable and rarely requiring her LEILA. Continue on Breo 200 and LEILA as needed. \par I reminded the patient she only needs to use the Breo once a day and to always rinse mouth afterward. Will provide a refill of the Breo today per pt request. \par \par DVT\par \par Remains off eliquis due to extensive nose bleeds that required hospital visit. No signs of reoccurrence, continue to monitor, especially given the concern her malignancy has recurred. \par \par F/U in 3mos or sooner if necessary

## 2020-12-10 NOTE — HISTORY OF PRESENT ILLNESS
[Stable] : are stable [Difficulty Breathing During Exertion] : denies dyspnea on exertion [Feelings Of Weakness On Exertion] : denies exercise intolerance [Cough] : denies coughing [Wheezing] : denies wheezing [Regional Soft Tissue Swelling Both Lower Extremities] : denies lower extremity edema [Chest Pain Or Discomfort] : denies chest pain [Fever] : denies fever [0  -  Nothing at all] : 0, nothing at all [Class II - Mild Symptoms and Slight Limitations] : II [More Frequent Use Needed Recently] : Patient reports recent increase in frequency of [___ Times a Day] : [unfilled] time(s) a day [None] : None [Adherent] : the patient is adherent with ~his/her~ medication regimen [Goals--Doing Well] : the patient is doing well with ~his/her~ goals [TextBox_4] : 79 year old female with PMH of Asthma, pancreatic CA s/p Whipple 5/2019 and starting chemo June 24th (chemo for 6 months) and starting on Xeloda (followed by Dr. Pruitt) and Pulmonary nodules.  She got a right sided chemo port placed on 6/17/2019.  She has home attendant M-W-F for 5 hours. \par \par She is tolerating the chemo will continue until November.  She is tolerating the chemo treatment.  She will then go for repeat CT scan at that time.  She is doing well. Appetite is good and maintaining her weight.\par \par She started to have bilateral leg swelling that started 2 weeks ago Dr. Pruitt sent her for doppler that was negative.  She has intermittent pain in the legs.   \par \par She denies any SOB she is able to walk and walk up stairs without SOB.  She denies any coughing, wheezing, or  fever.  She is using the Breo with occasionally using the Ventolin as needed approx. 1 time a day.  She is walking without SOB and  doing her ADL. Denies any ER visits.  Denies hemoptysis.  She denies any N/V, abdominal pain or blood in stool.\par \par 11/1/2019\par \par Pt was in ER after chemo due to LLE swelling she had a DVT found.  She was admitted and discharged on 10/24/2019.  She was started on Eliquis 5mg BID without any complications of bleeding. Her swelling in the leg has improved.  She now has DM and is currently taking metformin.  \par \par She denies any SOB and able to walk without SOB, coughing, fever or wheezing. She is currently using the breo 2 times a day.  she rarely requiring her LEILA.  \par \par 2/6/2020: \par patient reports to be doing well and denies any cough/fever/chills/dyspnea. She has been used albuterol 2x last week. using breo regularly.\par \par 7/2/2020\par \par Pt present today for follow up.  Last CT scan was in March getting the report from Dr. Pruitt no change and next CT is scheduled for September with oncology.\par \par She is compliant with Breo daily and using the Ventolin rarely.  She is off eliquis due to nose bleeds requiring ER visits.  She walks 10 blocks without SOB.  She has been safe with COVID.\par \par 9/10/2020\par \par Comes in today for follow-up of asthma and to discuss CT scan which showed stability of subcentimeter pulmonary nodules. Also says pharmacy told her that her albuterol HFA LEILA is no longer covered by insurance. \par \par She is compliant w/ Breo - says she has actually been using twice a day. Always rinses after. Rarely needs LEILA -- average twice a week at most. No nosebleeds. Has annual physical with PMD tomorrow and plans to get bone density done for them. \par \par 12/10/2020\par \par Comes to follow-up of asthma and discuss CT scan results. She had followed with Dr. Alston since last visit who ordered her for CT C/A/P with contrast which she got done about 2 weeks ago. She had fu appt w/ Dr. Alston's partner who went over CT results which showed findings concerning for metastatic disease and sister neida arroyo nodule concern for ovarian vs. recurrent pancreatic CA. She was supposed to have biopsy done tomorrow but cancelled procedure because she would like to first obtain second opinion from [facility] oncologist. \par \par From respiratory point of view she is doing well. Using Breo once a day, rarely needing the ventolin. No wheezing, SOB or PND. Infrequent dry cough but no mucus production. Sleeping well. Has good appetite. Staying active. Needs refill of Breo.  [Wt Gain ___ Lbs] : no recent weight gain [Wt Loss ___ Lbs] : no recent weight loss [Oxygen] : the patient uses no supplemental oxygen [Good Control] : peak flow has been poor [Side Effects] : ~He/She~ denies medication side effects

## 2021-01-01 ENCOUNTER — APPOINTMENT (OUTPATIENT)
Dept: PULMONOLOGY | Facility: CLINIC | Age: 81
End: 2021-01-01
Payer: MEDICARE

## 2021-01-01 ENCOUNTER — APPOINTMENT (OUTPATIENT)
Dept: INTERNAL MEDICINE | Facility: CLINIC | Age: 81
End: 2021-01-01
Payer: MEDICARE

## 2021-01-01 VITALS
TEMPERATURE: 98.2 F | OXYGEN SATURATION: 99 % | HEIGHT: 56 IN | SYSTOLIC BLOOD PRESSURE: 110 MMHG | WEIGHT: 80 LBS | BODY MASS INDEX: 18 KG/M2 | HEART RATE: 127 BPM | DIASTOLIC BLOOD PRESSURE: 75 MMHG

## 2021-01-01 VITALS
HEART RATE: 94 BPM | SYSTOLIC BLOOD PRESSURE: 137 MMHG | OXYGEN SATURATION: 98 % | DIASTOLIC BLOOD PRESSURE: 71 MMHG | BODY MASS INDEX: 20.4 KG/M2 | TEMPERATURE: 98 F | WEIGHT: 91 LBS

## 2021-01-01 DIAGNOSIS — R91.8 OTHER NONSPECIFIC ABNORMAL FINDING OF LUNG FIELD: ICD-10-CM

## 2021-01-01 DIAGNOSIS — J45.909 UNSPECIFIED ASTHMA, UNCOMPLICATED: ICD-10-CM

## 2021-01-01 DIAGNOSIS — J47.9 BRONCHIECTASIS, UNCOMPLICATED: ICD-10-CM

## 2021-01-01 DIAGNOSIS — E11.9 TYPE 2 DIABETES MELLITUS W/OUT COMPLICATIONS: ICD-10-CM

## 2021-01-01 DIAGNOSIS — R10.11 RIGHT UPPER QUADRANT PAIN: ICD-10-CM

## 2021-01-01 DIAGNOSIS — Z09 ENCOUNTER FOR FOLLOW-UP EXAMINATION AFTER COMPLETED TREATMENT FOR CONDITIONS OTHER THAN MALIGNANT NEOPLASM: ICD-10-CM

## 2021-01-01 DIAGNOSIS — R14.0 ABDOMINAL DISTENSION (GASEOUS): ICD-10-CM

## 2021-01-01 DIAGNOSIS — Z60.2 PROBLEMS RELATED TO LIVING ALONE: ICD-10-CM

## 2021-01-01 DIAGNOSIS — C25.9 MALIGNANT NEOPLASM OF PANCREAS, UNSPECIFIED: ICD-10-CM

## 2021-01-01 PROCEDURE — 99213 OFFICE O/P EST LOW 20 MIN: CPT | Mod: GC

## 2021-01-01 PROCEDURE — 99213 OFFICE O/P EST LOW 20 MIN: CPT

## 2021-01-01 RX ORDER — SIMETHICONE 125 MG/1
125 CAPSULE, LIQUID FILLED ORAL 4 TIMES DAILY
Qty: 56 | Refills: 0 | Status: DISCONTINUED | COMMUNITY
Start: 2020-01-01 | End: 2021-01-01

## 2021-01-01 RX ORDER — ALBUTEROL SULFATE 90 UG/1
108 (90 BASE) AEROSOL, METERED RESPIRATORY (INHALATION)
Qty: 1 | Refills: 3 | Status: ACTIVE | COMMUNITY
Start: 2018-10-02 | End: 1900-01-01

## 2021-01-01 RX ORDER — PANCRELIPASE 15000; 3000; 9500 [USP'U]/1; [USP'U]/1; [USP'U]/1
3000-9500 CAPSULE, DELAYED RELEASE ORAL 3 TIMES DAILY
Qty: 90 | Refills: 3 | Status: ACTIVE | COMMUNITY
Start: 2021-01-01 | End: 1900-01-01

## 2021-01-01 RX ORDER — METFORMIN HYDROCHLORIDE 500 MG/1
500 TABLET, COATED ORAL
Qty: 60 | Refills: 3 | Status: ACTIVE | COMMUNITY
Start: 2019-10-28 | End: 1900-01-01

## 2021-01-01 SDOH — SOCIAL STABILITY - SOCIAL INSECURITY: PROBLEMS RELATED TO LIVING ALONE: Z60.2

## 2021-01-21 PROBLEM — Z60.2 LIVES ALONE WITHOUT HELP AVAILABLE: Status: ACTIVE | Noted: 2019-03-14

## 2021-01-21 PROBLEM — Z09 CHEMOTHERAPY FOLLOW-UP EXAMINATION: Status: ACTIVE | Noted: 2019-08-08

## 2021-01-21 PROBLEM — R10.11 RUQ DISCOMFORT: Status: ACTIVE | Noted: 2020-01-01

## 2021-01-21 PROBLEM — R14.0 ABDOMINAL BLOATING: Status: ACTIVE | Noted: 2020-01-01

## 2021-01-21 PROBLEM — E11.9 DIABETES MELLITUS: Status: ACTIVE | Noted: 2019-10-28

## 2021-01-21 PROBLEM — C25.9 PANCREATIC CANCER: Status: ACTIVE | Noted: 2019-06-21

## 2021-03-11 PROBLEM — J45.909 ASTHMA: Status: ACTIVE | Noted: 2018-10-02

## 2021-03-11 PROBLEM — J47.9 BRONCHIECTASIS: Status: ACTIVE | Noted: 2020-01-01

## 2021-03-11 PROBLEM — R91.8 MULTIPLE PULMONARY NODULES: Status: ACTIVE | Noted: 2018-10-15

## 2021-03-11 NOTE — HISTORY OF PRESENT ILLNESS
[Stable] : are stable [Difficulty Breathing During Exertion] : denies dyspnea on exertion [Feelings Of Weakness On Exertion] : denies exercise intolerance [Cough] : denies coughing [Wheezing] : denies wheezing [Regional Soft Tissue Swelling Both Lower Extremities] : denies lower extremity edema [Chest Pain Or Discomfort] : denies chest pain [Fever] : denies fever [0  -  Nothing at all] : 0, nothing at all [Class II - Mild Symptoms and Slight Limitations] : II [More Frequent Use Needed Recently] : Patient reports recent increase in frequency of [___ Times a Day] : [unfilled] time(s) a day [None] : None [Adherent] : the patient is adherent with ~his/her~ medication regimen [Goals--Doing Well] : the patient is doing well with ~his/her~ goals [TextBox_4] : 79 year old female with PMH of Asthma, pancreatic CA s/p Whipple 5/2019 and starting chemo June 24th (chemo for 6 months) and starting on Xeloda (followed by Dr. Pruitt) and Pulmonary nodules.  She got a right sided chemo port placed on 6/17/2019.  She has home attendant M-W-F for 5 hours. \par \par She is tolerating the chemo will continue until November.  She is tolerating the chemo treatment.  She will then go for repeat CT scan at that time.  She is doing well. Appetite is good and maintaining her weight.\par \par She started to have bilateral leg swelling that started 2 weeks ago Dr. Pruitt sent her for doppler that was negative.  She has intermittent pain in the legs.   \par \par She denies any SOB she is able to walk and walk up stairs without SOB.  She denies any coughing, wheezing, or  fever.  She is using the Breo with occasionally using the Ventolin as needed approx. 1 time a day.  She is walking without SOB and  doing her ADL. Denies any ER visits.  Denies hemoptysis.  She denies any N/V, abdominal pain or blood in stool.\par \par 11/1/2019\par \par Pt was in ER after chemo due to LLE swelling she had a DVT found.  She was admitted and discharged on 10/24/2019.  She was started on Eliquis 5mg BID without any complications of bleeding. Her swelling in the leg has improved.  She now has DM and is currently taking metformin.  \par \par She denies any SOB and able to walk without SOB, coughing, fever or wheezing. She is currently using the breo 2 times a day.  she rarely requiring her LEILA.  \par \par 2/6/2020: \par patient reports to be doing well and denies any cough/fever/chills/dyspnea. She has been used albuterol 2x last week. using breo regularly.\par \par 7/2/2020\par \par Pt present today for follow up.  Last CT scan was in March getting the report from Dr. Pruitt no change and next CT is scheduled for September with oncology.\par \par She is compliant with Breo daily and using the Ventolin rarely.  She is off eliquis due to nose bleeds requiring ER visits.  She walks 10 blocks without SOB.  She has been safe with COVID.\par \par 9/10/2020\par \par Comes in today for follow-up of asthma and to discuss CT scan which showed stability of subcentimeter pulmonary nodules. Also says pharmacy told her that her albuterol HFA LEILA is no longer covered by insurance. \par \par She is compliant w/ Breo - says she has actually been using twice a day. Always rinses after. Rarely needs LEILA -- average twice a week at most. No nosebleeds. Has annual physical with PMD tomorrow and plans to get bone density done for them. \par \par 12/10/2020\par \par Comes to follow-up of asthma and discuss CT scan results. She had followed with Dr. Alston since last visit who ordered her for CT C/A/P with contrast which she got done about 2 weeks ago. She had fu appt w/ Dr. Alston's partner who went over CT results which showed findings concerning for metastatic disease and sister neida arroyo nodule concern for ovarian vs. recurrent pancreatic CA. She was supposed to have biopsy done tomorrow but cancelled procedure because she would like to first obtain second opinion from [facility] oncologist. \par \par She is losign weight.  She is using the Breo every day.  She is using the rescue dose twice a day.  She is not eating.  She is getting chemo [Wt Gain ___ Lbs] : no recent weight gain [Wt Loss ___ Lbs] : no recent weight loss [Oxygen] : the patient uses no supplemental oxygen [Good Control] : peak flow has been poor [Side Effects] : ~He/She~ denies medication side effects

## 2021-03-11 NOTE — ASSESSMENT
[FreeTextEntry1] : 81yo woman w/ asthma, pancreatic CA s/p Whipple 5/2019, h/o DVT 2019, and multiple stable subcentimeter pulmonary nodules coming in for f/u of asthma and nodules.\par \par #Pulmonary nodules\par \par Pt was originally referred to us for an abnormal CXR from her PCP Dr. Mercado.  She is now following PCP Dr. Burch. Her 6mo chest CTs were stable in March and August. Her repeat CT C/A/P w/ IVC revealed what could be recurrence of malignancy with metastases possibly of ovarian vs. recurrent pancreatic CA. Her pulmonary nodules appear to be stable with few new micronodules. The possibility of malignant etiology of nodules exists given her abdominal findings though the size stability of these nodules is reassuring. She is establishing with [facility] for second oncology opinion and we agree she should be evaluated and can follow up their recommendations. \par She is on chemo.  She is following with CT images according the oncologist.  She had peritoneocentesis last week and drained 3 liters\par \par Asthma\par \par Pt is currently stable and rarely requiring her LEILA. Continue on Breo 200 and LEILA as needed. \par I reminded the patient she only needs to use the Breo once a day and to always rinse mouth afterward. Will provide a refill of the Breo today per pt request. \par \par DVT\par \par Remains off eliquis due to extensive nose bleeds that required hospital visit. No signs of reoccurrence, continue to monitor, especially given the concern her malignancy has recurred.  n The leg was swollen and she had venous doppler yesterday and waiting for the results.  The base line oxygen satiuration is normal\par \par F/U in 3mos or sooner if necessary

## 2021-04-02 ENCOUNTER — APPOINTMENT (OUTPATIENT)
Dept: INTERNAL MEDICINE | Facility: CLINIC | Age: 81
End: 2021-04-02

## 2021-06-22 ENCOUNTER — APPOINTMENT (OUTPATIENT)
Dept: PULMONOLOGY | Facility: CLINIC | Age: 81
End: 2021-06-22

## 2021-09-26 NOTE — ED ADULT NURSE NOTE - NSIMPLEMENTINTERV_GEN_ALL_ED
[FreeTextEntry1] : 73 yo female with h/o as above including hyperlipidemia and osteopenia here for CPE.\par 1.  CV - bp at goal, check lipids and cmp on statin\par 2.  Endo - dexa utd, check vitamin D\par 3.  Gyn - no further paps needed for age, wants to hold off on mammo this year\par 4.  GI - colonoscopy reportedly utd, pt to try to get colonoscopy report for our records\par 5.  HCM - check below labs; flu shot today, consider tdap/shingrix, other vaccines utd\par 6.  RTO prn or 1 year\par \par 
Implemented All Universal Safety Interventions:  [place] to call system. Call bell, personal items and telephone within reach. Instruct patient to call for assistance. Room bathroom lighting operational. Non-slip footwear when patient is off stretcher. Physically safe environment: no spills, clutter or unnecessary equipment. Stretcher in lowest position, wheels locked, appropriate side rails in place.

## 2024-06-11 NOTE — PATIENT PROFILE ADULT - ANY SIGNIFICANT CHANGE IN ABILITY TO PERFORM THE FOLLOWING ADL SINCE THE ONSET OF PRESENT ILLNESS?
"    Subjective:     Encounter Date:06/11/2024      Patient ID: Shilpa Vides is a 58 y.o. female.    Chief Complaint: Chest pain  HPI  This is a 58-year-old female patient who presents to cardiology clinic for second opinion regarding chest discomfort, shortness of breath and palpitations.  The patient has had symptoms dating back to at least 2021.  The patient had heart catheterization in June 2021 and was diagnosed with apical ballooning syndrome/stress-induced cardiomyopathy.  At that time the patient had invasive coronary angiography disclosing no significant coronary artery disease with apical dyskinesis and an ejection fraction of 30%.  The patient was diagnosed with another episode of stress-induced cardiomyopathy in August of last year.  She apparently underwent repeat invasive coronary angiography by Dr. Louis but did not require any revascularization.  2 months ago the patient began experiencing worsening symptoms.  She describes a diffuse anterior pressure sensation which radiates into her left shoulder and between her shoulder blades.  The discomfort occurs on a daily basis multiple times per day \"off and on\" throughout the course of the day.  It is associated with shortness of breath.  She indicates the longest episode is usually several hours in duration.  She also reports shortness of breath with activity.  There is no orthopnea PND or lower extremity edema.  She also complains of palpitations with a sense that her heart is racing.  She has had some dizziness but no syncope.  She is a non-smoker.  She is on guideline directed medical therapy for LV systolic heart failure.  She reports compliance with her medications with no perceived side effects.  The patient has extreme anxiety disorder and her  reports that she has been under a great deal of stress related to her adult children, 1 of whom is incarcerated and the other just got out of drug rehab.  There also appears to be drug related " issues with another child.  The patient's  indicates that she becomes extremely emotionally upset over these issues.  She is currently on a combination of Zoloft and as needed Valium.  She has not seen a mental health professional.  The following portions of the patient's history were reviewed and updated as appropriate: allergies, current medications, past family history, past medical history, past social history, past surgical history and problem  Review of Systems   Constitutional: Negative for chills, diaphoresis, fever, malaise/fatigue, weight gain and weight loss.   HENT:  Negative for ear discharge, hearing loss, hoarse voice and nosebleeds.    Eyes:  Negative for discharge, double vision, pain and photophobia.   Cardiovascular:  Positive for chest pain, dyspnea on exertion and palpitations. Negative for claudication, cyanosis, irregular heartbeat, leg swelling, near-syncope, orthopnea, paroxysmal nocturnal dyspnea and syncope.   Respiratory:  Positive for shortness of breath. Negative for cough, hemoptysis, sputum production and wheezing.    Endocrine: Negative for cold intolerance, heat intolerance, polydipsia, polyphagia and polyuria.   Hematologic/Lymphatic: Negative for adenopathy and bleeding problem. Does not bruise/bleed easily.   Skin:  Negative for color change, flushing, itching and rash.   Musculoskeletal:  Negative for muscle cramps, muscle weakness, myalgias and stiffness.   Gastrointestinal:  Negative for abdominal pain, diarrhea, hematemesis, hematochezia, nausea and vomiting.   Genitourinary:  Negative for dysuria, frequency and nocturia.   Neurological:  Negative for focal weakness, loss of balance, numbness, paresthesias and seizures.   Psychiatric/Behavioral:  Negative for altered mental status, hallucinations and suicidal ideas.    Allergic/Immunologic: Negative for HIV exposure, hives and persistent infections.           Current Outpatient Medications:     aspirin 81 MG EC tablet,  "Take 1 tablet by mouth Daily. On hold this week., Disp: , Rfl:     atorvastatin (LIPITOR) 40 MG tablet, Take 2 tablets by mouth Daily., Disp: , Rfl:     Cholecalciferol (VITAMIN D) 2000 UNITS tablet, Take 1 tablet by mouth Daily., Disp: , Rfl:     clopidogrel (PLAVIX) 75 MG tablet, Take 1 tablet by mouth Daily., Disp: , Rfl:     diazePAM (VALIUM) 5 MG tablet, Take 1 tablet by mouth Daily As Needed., Disp: , Rfl:     meclizine (ANTIVERT) 25 MG tablet, Take 1 tablet by mouth 4 (Four) Times a Day As Needed for dizziness., Disp: 30 tablet, Rfl: 0    metoprolol succinate XL (TOPROL-XL) 25 MG 24 hr tablet, Take 1 tablet by mouth Daily., Disp: , Rfl:     ondansetron ODT (ZOFRAN-ODT) 4 MG disintegrating tablet, Place 1 tablet on the tongue Every 8 (Eight) Hours As Needed for Nausea or Vomiting., Disp: 20 tablet, Rfl: 0    sacubitril-valsartan (Entresto) 24-26 MG tablet, Take 1 tablet by mouth 2 (Two) Times a Day., Disp: , Rfl:     sertraline (ZOLOFT) 25 MG tablet, Take 1 tablet by mouth Daily., Disp: , Rfl:     Objective:   Physical Exam  Blood pressure 128/82, pulse 60, resp. rate 17, height 64 cm (25.2\"), weight 70.6 kg (155 lb 9.6 oz), SpO2 96%, not currently breastfeeding.   Lab Review:     Assessment:       1. 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    2. Precordial pain  Atypical chest pain.  Multiple risk factors for coronary artery disease.  Escalation of symptoms.  The patient has not had an ischemic evaluation since August of last year.  She reports being unable to do treadmill exercise stress testing due to effort limiting dyspnea.    3. Primary hypertension  Acceptable blood pressure control.    4. Chronic systolic congestive heart failure  Heart failure with reduced ejection fraction.  Stage C.  New York Heart Association functional class III " symptoms.  Euvolemic.  The patient's last documented echocardiogram showed an ejection fraction of 30%.    5. Dyslipidemia  Tolerating high intensity statin based cholesterol-lowering therapy.    6. SOB (shortness of breath)  Multifactorial.    Procedures    Plan:     I have recommended a vasodilator nuclear stress test utilizing a 2-day imaging protocol to help mitigate for attenuation artifact which is anticipated given her body habitus.    I have recommended an echocardiogram.    I have recommended an outpatient cardiac monitor.    I have recommended referral to the mental health clinic locally for more effective therapy for her extreme anxiety disorder.    Further recommendations will be predicated on the results of her outpatient testing.       no

## 2024-09-16 NOTE — ASSESSMENT
[FreeTextEntry1] : 78 female with PMH of asthma pancreatic cysts presents for a follow up visit to discuss her recent EUS and further plans for surgery, seeks second opinion, reassurance. \par Pancreatic cysts:\par -EUS/FNA 1/19 :  was found to have multiple pancreatic cyst with a large complex panc cyst in distal body/tail with enhancing mural nodule, FNA was + for a mucinous lesion\par -explained in detail to the patient about pancreatic cysts and the type of cyst she has and the concerning features of her cysts like change in size, appearance and pathology best classified as pre cancerous lesion at this point\par -all her questions and concerns were addressed about the procedure, role of splenectomy\par -patient advised and will follow up with Dr Bocanegra, follow up with her PMD for preop testing, and possibly Dr Walsh as request for 2nd opinion to discuss surgical options
18